# Patient Record
Sex: FEMALE | Race: OTHER | HISPANIC OR LATINO | URBAN - METROPOLITAN AREA
[De-identification: names, ages, dates, MRNs, and addresses within clinical notes are randomized per-mention and may not be internally consistent; named-entity substitution may affect disease eponyms.]

---

## 2017-10-17 ENCOUNTER — EMERGENCY (EMERGENCY)
Facility: HOSPITAL | Age: 22
LOS: 1 days | Discharge: HOME | End: 2017-10-17
Attending: EMERGENCY MEDICINE | Admitting: EMERGENCY MEDICINE
Payer: MEDICAID

## 2017-10-17 VITALS
RESPIRATION RATE: 18 BRPM | TEMPERATURE: 98 F | DIASTOLIC BLOOD PRESSURE: 78 MMHG | HEART RATE: 83 BPM | SYSTOLIC BLOOD PRESSURE: 125 MMHG | OXYGEN SATURATION: 100 %

## 2017-10-17 VITALS — WEIGHT: 164.91 LBS | HEIGHT: 65 IN

## 2017-10-17 DIAGNOSIS — O46.90 ANTEPARTUM HEMORRHAGE, UNSPECIFIED, UNSPECIFIED TRIMESTER: ICD-10-CM

## 2017-10-17 LAB
ALBUMIN SERPL ELPH-MCNC: 4.1 G/DL — SIGNIFICANT CHANGE UP (ref 3.3–5.2)
ALP SERPL-CCNC: 53 U/L — SIGNIFICANT CHANGE UP (ref 40–120)
ALT FLD-CCNC: 21 U/L — SIGNIFICANT CHANGE UP
ANION GAP SERPL CALC-SCNC: 17 MMOL/L — SIGNIFICANT CHANGE UP (ref 5–17)
AST SERPL-CCNC: 29 U/L — SIGNIFICANT CHANGE UP
BASOPHILS # BLD AUTO: 0 K/UL — SIGNIFICANT CHANGE UP (ref 0–0.2)
BASOPHILS NFR BLD AUTO: 0.2 % — SIGNIFICANT CHANGE UP (ref 0–2)
BILIRUB SERPL-MCNC: 0.1 MG/DL — LOW (ref 0.4–2)
BLD GP AB SCN SERPL QL: SIGNIFICANT CHANGE UP
BUN SERPL-MCNC: 13 MG/DL — SIGNIFICANT CHANGE UP (ref 8–20)
CALCIUM SERPL-MCNC: 9.3 MG/DL — SIGNIFICANT CHANGE UP (ref 8.6–10.2)
CHLORIDE SERPL-SCNC: 104 MMOL/L — SIGNIFICANT CHANGE UP (ref 98–107)
CO2 SERPL-SCNC: 18 MMOL/L — LOW (ref 22–29)
CREAT SERPL-MCNC: 0.57 MG/DL — SIGNIFICANT CHANGE UP (ref 0.5–1.3)
EOSINOPHIL # BLD AUTO: 0 K/UL — SIGNIFICANT CHANGE UP (ref 0–0.5)
EOSINOPHIL NFR BLD AUTO: 0.4 % — SIGNIFICANT CHANGE UP (ref 0–6)
GLUCOSE SERPL-MCNC: 77 MG/DL — SIGNIFICANT CHANGE UP (ref 70–115)
HCG SERPL-ACNC: SIGNIFICANT CHANGE UP MIU/ML
HCT VFR BLD CALC: 37.3 % — SIGNIFICANT CHANGE UP (ref 37–47)
HGB BLD-MCNC: 12.9 G/DL — SIGNIFICANT CHANGE UP (ref 12–16)
LYMPHOCYTES # BLD AUTO: 2.4 K/UL — SIGNIFICANT CHANGE UP (ref 1–4.8)
LYMPHOCYTES # BLD AUTO: 23.1 % — SIGNIFICANT CHANGE UP (ref 20–55)
MCHC RBC-ENTMCNC: 29.5 PG — SIGNIFICANT CHANGE UP (ref 27–31)
MCHC RBC-ENTMCNC: 34.6 G/DL — SIGNIFICANT CHANGE UP (ref 32–36)
MCV RBC AUTO: 85.4 FL — SIGNIFICANT CHANGE UP (ref 81–99)
MONOCYTES # BLD AUTO: 0.4 K/UL — SIGNIFICANT CHANGE UP (ref 0–0.8)
MONOCYTES NFR BLD AUTO: 4.3 % — SIGNIFICANT CHANGE UP (ref 3–10)
NEUTROPHILS # BLD AUTO: 7.4 K/UL — SIGNIFICANT CHANGE UP (ref 1.8–8)
NEUTROPHILS NFR BLD AUTO: 70.7 % — SIGNIFICANT CHANGE UP (ref 37–73)
PLATELET # BLD AUTO: 225 K/UL — SIGNIFICANT CHANGE UP (ref 150–400)
POTASSIUM SERPL-MCNC: 4.7 MMOL/L — SIGNIFICANT CHANGE UP (ref 3.5–5.3)
POTASSIUM SERPL-SCNC: 4.7 MMOL/L — SIGNIFICANT CHANGE UP (ref 3.5–5.3)
PROT SERPL-MCNC: 7.6 G/DL — SIGNIFICANT CHANGE UP (ref 6.6–8.7)
RBC # BLD: 4.37 M/UL — LOW (ref 4.4–5.2)
RBC # FLD: 13 % — SIGNIFICANT CHANGE UP (ref 11–15.6)
SODIUM SERPL-SCNC: 139 MMOL/L — SIGNIFICANT CHANGE UP (ref 135–145)
TYPE + AB SCN PNL BLD: SIGNIFICANT CHANGE UP
WBC # BLD: 10.5 K/UL — SIGNIFICANT CHANGE UP (ref 4.8–10.8)
WBC # FLD AUTO: 10.5 K/UL — SIGNIFICANT CHANGE UP (ref 4.8–10.8)

## 2017-10-17 PROCEDURE — 99284 EMERGENCY DEPT VISIT MOD MDM: CPT

## 2017-10-17 PROCEDURE — 76801 OB US < 14 WKS SINGLE FETUS: CPT

## 2017-10-17 PROCEDURE — 36415 COLL VENOUS BLD VENIPUNCTURE: CPT

## 2017-10-17 PROCEDURE — 99284 EMERGENCY DEPT VISIT MOD MDM: CPT | Mod: 25

## 2017-10-17 PROCEDURE — 85027 COMPLETE CBC AUTOMATED: CPT

## 2017-10-17 PROCEDURE — 86850 RBC ANTIBODY SCREEN: CPT

## 2017-10-17 PROCEDURE — 76815 OB US LIMITED FETUS(S): CPT | Mod: 26

## 2017-10-17 PROCEDURE — T1013: CPT

## 2017-10-17 PROCEDURE — 84702 CHORIONIC GONADOTROPIN TEST: CPT

## 2017-10-17 PROCEDURE — 80053 COMPREHEN METABOLIC PANEL: CPT

## 2017-10-17 PROCEDURE — 76815 OB US LIMITED FETUS(S): CPT

## 2017-10-17 PROCEDURE — 86900 BLOOD TYPING SEROLOGIC ABO: CPT

## 2017-10-17 PROCEDURE — 76801 OB US < 14 WKS SINGLE FETUS: CPT | Mod: 26

## 2017-10-17 PROCEDURE — 86901 BLOOD TYPING SEROLOGIC RH(D): CPT

## 2017-10-17 NOTE — ED PROVIDER NOTE - PROGRESS NOTE DETAILS
patient deferred GYN exam care signed out to KATHIE Kapoor to f/u labs, UA and US and possible GYN consult OBGYN to see PT Pt seen by MD TENISHA states PT to follow up Out PT, PT educated on when to return to ED.

## 2017-10-17 NOTE — ED PROVIDER NOTE - OBJECTIVE STATEMENT
This is a 22 year old female with c/o vaginal bleeding x 1 day, states feels like a "menstrual cycle."  She notes is approximately 10 weeks pregnant.  She admits this is her first pregnancy and f/u in [facility] clinic and has blood work done and does not know the result.  She denies any abdominal pain, fevers, chills, n/v/d or any sick contacts or recent travel or rashes.

## 2017-10-17 NOTE — ED PROVIDER NOTE - ATTENDING CONTRIBUTION TO CARE
I, Fide Razo, performed the initial face to face bedside interview with this patient regarding history of present illness, review of symptoms and relevant past medical, social and family history.  I completed an independent physical examination.  I was the initial provider who evaluated this patient. I have signed out the follow up of any pending tests (i.e. labs, radiological studies) to the ACP.  I have communicated the patient’s plan of care and disposition with the ACP.

## 2017-10-17 NOTE — CONSULT NOTE ADULT - PROBLEM SELECTOR RECOMMENDATION 9
-most like 2/2 to sexually activity   -no active bleeding, pt hemodynamically stable  - no sexually activity in the next 2 weeks  -f/u with [facility] obgyn doctor  -seek medical attention in case of severe/profuse vaginal bleeding   -Pt is O+, recent blood work from [facility] is nl  -US shows a viable 12 weeks pregnancy  -H&H stable -most like 2/2 to sexually activity   - pt hemodynamically stable   - no sexually activity in the next 2 weeks  -f/u with [facility] obgyn doctor  -seek medical attention in case of severe/profuse vaginal bleeding   -Pt is O+, recent blood work from [facility] is nl  -US shows a viable 12 weeks pregnancy  -H&H stable

## 2017-10-17 NOTE — CONSULT NOTE ADULT - SUBJECTIVE AND OBJECTIVE BOX
DAQUAN CHEUNG  A 22y  LMP 17 at 12. 1 weeks by LMP came to the ED with CC of vaginal bleeding, pt state that she had just one episode of VB this am after sexually activity , she changed her pap just one time, no previous hx of vaginal bleeding, no recent trauma, no pmh of bleeding disorders . Pt is in f/u in the [facility], no acitve bleeding at this time, hemodynamically stable.       PAST MEDICAL & SURGICAL HISTORY: none   No pertinent past medical history: none  No significant past surgical history: none       Allergies:  No Known Allergies    Intolerances      FAMILY HISTORY: unremarkable     Social History: Denies ETOH, smoking and drugs.   POB/GYN Hx: regular menses lasting 4-5 days , no previous abortions     Vital Signs:  Vital Signs Last 24 Hrs  T(C): 36.7 (17 Oct 2017 17:56), Max: 36.7 (17 Oct 2017 17:56)  T(F): 98 (17 Oct 2017 17:56), Max: 98 (17 Oct 2017 17:56)  HR: 83 (17 Oct 2017 17:56) (83 - 83)  BP: 125/78 (17 Oct 2017 17:56) (125/78 - 125/78)  BP(mean): --  RR: 18 (17 Oct 2017 17:56) (18 - 18)  SpO2: 100% (17 Oct 2017 17:56) (100% - 100%)    Physical Exam:  General: NAD  CVS: RRR, +S1/S2  Lungs: CTAB, no wheeze, ronchi or rales.  Abdomen: +BS, soft, NT to palpation   Ext: No cyanosis, edema or calf tenderness.     Labs:                          12.9   10.5  )-----------( 225      ( 17 Oct 2017 19:17 )             37.3   10-17    139  |  104  |  13.0  ----------------------------<  77  4.7   |  18.0<L>  |  0.57    Ca    9.3      17 Oct 2017 19:17    TPro  7.6  /  Alb  4.1  /  TBili  0.1<L>  /  DBili  x   /  AST  29  /  ALT  21  /  AlkPhos  53  10-17      HCG Quantitative, Serum: 70160 mIU/mL (10-17-17 @ 19:17)      Radiology:   US Echo OB <=14 Weeks, First Gestation (10.17.17 @ 20:19) >    IMPRESSION:    Single live intrauterine pregnancy.  Estimated gestational age of 12 weeks and 0 days  Estimated due date of  2018.  Additional findings above.          MEDICATIONS  (STANDING):    MEDICATIONS  (PRN): DAQUAN CHEUNG  A 22y  LMP 17 at 12. 1 weeks by LMP came to the ED with CC of vaginal bleeding, pt state that she had just one episode of VB this am after sexually activity , she changed her pad just one time, no previous hx of vaginal bleeding, no recent trauma, no pmh of bleeding disorders . Pt is in f/u in the [facility], no acitve bleeding at this time, hemodynamically stable.       PAST MEDICAL & SURGICAL HISTORY: none   No pertinent past medical history: none  No significant past surgical history: none       Allergies:  No Known Allergies    Intolerances      FAMILY HISTORY: unremarkable     Social History: Denies ETOH, smoking and drugs.   POB/GYN Hx: regular menses lasting 4-5 days , no previous abortions     Vital Signs:  Vital Signs Last 24 Hrs  T(C): 36.7 (17 Oct 2017 17:56), Max: 36.7 (17 Oct 2017 17:56)  T(F): 98 (17 Oct 2017 17:56), Max: 98 (17 Oct 2017 17:56)  HR: 83 (17 Oct 2017 17:56) (83 - 83)  BP: 125/78 (17 Oct 2017 17:56) (125/78 - 125/78)  BP(mean): --  RR: 18 (17 Oct 2017 17:56) (18 - 18)  SpO2: 100% (17 Oct 2017 17:56) (100% - 100%)    Physical Exam:  General: NAD  CVS: RRR, +S1/S2  Lungs: CTAB, no wheeze, ronchi or rales.  Abdomen: +BS, soft, NT to palpation   Ext: No cyanosis, edema or calf tenderness.     Labs:                          12.9   10.5  )-----------( 225      ( 17 Oct 2017 19:17 )             37.3   10-17    139  |  104  |  13.0  ----------------------------<  77  4.7   |  18.0<L>  |  0.57    Ca    9.3      17 Oct 2017 19:17    TPro  7.6  /  Alb  4.1  /  TBili  0.1<L>  /  DBili  x   /  AST  29  /  ALT  21  /  AlkPhos  53  10-17      HCG Quantitative, Serum: 23806 mIU/mL (10-17-17 @ 19:17)      Radiology:   US Echo OB <=14 Weeks, First Gestation (10.17.17 @ 20:19) >    IMPRESSION:    Single live intrauterine pregnancy.  Estimated gestational age of 12 weeks and 0 days  Estimated due date of  2018.  Additional findings above.          MEDICATIONS  (STANDING):    MEDICATIONS  (PRN):

## 2017-10-17 NOTE — CONSULT NOTE ADULT - ATTENDING COMMENTS
patient status post intercourse with subsequent vaginal bleed. patient denies any active bleeding at this time and sono shows viable 12 week fetus. patient instructed to avoid intercourse until follow up with MD at the [facility] center  subsequent sono scheduled for 10/23/2017

## 2017-10-17 NOTE — CONSULT NOTE ADULT - ASSESSMENT
A 22y  LMP 17 at 12. 1 weeks by LMP and 12 weeks by US, cc of vaginal bleeding this am after having sexually activity , stable , no active bleeding at this time A 22y  LMP 17 at 12. 1 weeks by LMP and 12 weeks by US, cc of vaginal bleeding this am after having sexually activity , stable

## 2017-10-23 ENCOUNTER — ASOB RESULT (OUTPATIENT)
Age: 22
End: 2017-10-23

## 2017-10-23 ENCOUNTER — APPOINTMENT (OUTPATIENT)
Dept: ANTEPARTUM | Facility: CLINIC | Age: 22
End: 2017-10-23
Payer: MEDICAID

## 2017-10-23 PROBLEM — Z00.00 ENCOUNTER FOR PREVENTIVE HEALTH EXAMINATION: Status: ACTIVE | Noted: 2017-10-23

## 2017-10-23 PROCEDURE — 76801 OB US < 14 WKS SINGLE FETUS: CPT

## 2017-12-18 ENCOUNTER — ASOB RESULT (OUTPATIENT)
Age: 22
End: 2017-12-18

## 2017-12-18 ENCOUNTER — APPOINTMENT (OUTPATIENT)
Dept: ANTEPARTUM | Facility: CLINIC | Age: 22
End: 2017-12-18
Payer: MEDICAID

## 2017-12-18 PROCEDURE — 76817 TRANSVAGINAL US OBSTETRIC: CPT

## 2017-12-18 PROCEDURE — 76811 OB US DETAILED SNGL FETUS: CPT

## 2018-01-02 ENCOUNTER — APPOINTMENT (OUTPATIENT)
Dept: ANTEPARTUM | Facility: CLINIC | Age: 23
End: 2018-01-02
Payer: MEDICAID

## 2018-01-02 ENCOUNTER — ASOB RESULT (OUTPATIENT)
Age: 23
End: 2018-01-02

## 2018-01-02 PROCEDURE — 76816 OB US FOLLOW-UP PER FETUS: CPT

## 2018-04-04 ENCOUNTER — APPOINTMENT (OUTPATIENT)
Dept: ANTEPARTUM | Facility: CLINIC | Age: 23
End: 2018-04-04

## 2018-04-04 PROBLEM — Z00.00 ENCOUNTER FOR PREVENTIVE HEALTH EXAMINATION: Noted: 2018-04-04

## 2018-04-16 ENCOUNTER — APPOINTMENT (OUTPATIENT)
Dept: ANTEPARTUM | Facility: CLINIC | Age: 23
End: 2018-04-16
Payer: MEDICAID

## 2018-04-16 PROCEDURE — 76819 FETAL BIOPHYS PROFIL W/O NST: CPT

## 2018-04-16 PROCEDURE — 76816 OB US FOLLOW-UP PER FETUS: CPT

## 2018-04-22 ENCOUNTER — OUTPATIENT (OUTPATIENT)
Dept: OUTPATIENT SERVICES | Facility: HOSPITAL | Age: 23
LOS: 1 days | End: 2018-04-22
Payer: MEDICAID

## 2018-04-22 DIAGNOSIS — O47.1 FALSE LABOR AT OR AFTER 37 COMPLETED WEEKS OF GESTATION: ICD-10-CM

## 2018-04-22 PROCEDURE — T1013: CPT

## 2018-04-22 PROCEDURE — G0463: CPT

## 2018-04-22 PROCEDURE — 59025 FETAL NON-STRESS TEST: CPT

## 2018-04-26 ENCOUNTER — INPATIENT (INPATIENT)
Facility: HOSPITAL | Age: 23
LOS: 2 days | Discharge: HOME | End: 2018-04-29
Attending: OBSTETRICS & GYNECOLOGY | Admitting: OBSTETRICS & GYNECOLOGY
Payer: MEDICAID

## 2018-04-26 DIAGNOSIS — O47.1 FALSE LABOR AT OR AFTER 37 COMPLETED WEEKS OF GESTATION: ICD-10-CM

## 2018-04-26 LAB
APPEARANCE UR: ABNORMAL
BACTERIA # UR AUTO: ABNORMAL
BASOPHILS # BLD AUTO: 0 K/UL — SIGNIFICANT CHANGE UP (ref 0–0.2)
BASOPHILS NFR BLD AUTO: 0.1 % — SIGNIFICANT CHANGE UP (ref 0–2)
BILIRUB UR-MCNC: NEGATIVE — SIGNIFICANT CHANGE UP
BLD GP AB SCN SERPL QL: SIGNIFICANT CHANGE UP
COLOR SPEC: YELLOW — SIGNIFICANT CHANGE UP
DIFF PNL FLD: NEGATIVE — SIGNIFICANT CHANGE UP
EOSINOPHIL # BLD AUTO: 0 K/UL — SIGNIFICANT CHANGE UP (ref 0–0.5)
EOSINOPHIL NFR BLD AUTO: 0.7 % — SIGNIFICANT CHANGE UP (ref 0–6)
EPI CELLS # UR: ABNORMAL
GLUCOSE UR QL: NEGATIVE MG/DL — SIGNIFICANT CHANGE UP
HCT VFR BLD CALC: 32.6 % — LOW (ref 37–47)
HGB BLD-MCNC: 10.7 G/DL — LOW (ref 12–16)
KETONES UR-MCNC: NEGATIVE — SIGNIFICANT CHANGE UP
LEUKOCYTE ESTERASE UR-ACNC: ABNORMAL
LYMPHOCYTES # BLD AUTO: 2.2 K/UL — SIGNIFICANT CHANGE UP (ref 1–4.8)
LYMPHOCYTES # BLD AUTO: 28.8 % — SIGNIFICANT CHANGE UP (ref 20–55)
MCHC RBC-ENTMCNC: 28.2 PG — SIGNIFICANT CHANGE UP (ref 27–31)
MCHC RBC-ENTMCNC: 32.8 G/DL — SIGNIFICANT CHANGE UP (ref 32–36)
MCV RBC AUTO: 86 FL — SIGNIFICANT CHANGE UP (ref 81–99)
MONOCYTES # BLD AUTO: 0.6 K/UL — SIGNIFICANT CHANGE UP (ref 0–0.8)
MONOCYTES NFR BLD AUTO: 7.5 % — SIGNIFICANT CHANGE UP (ref 3–10)
NEUTROPHILS # BLD AUTO: 4.6 K/UL — SIGNIFICANT CHANGE UP (ref 1.8–8)
NEUTROPHILS NFR BLD AUTO: 62 % — SIGNIFICANT CHANGE UP (ref 37–73)
NITRITE UR-MCNC: NEGATIVE — SIGNIFICANT CHANGE UP
PH UR: 6 — SIGNIFICANT CHANGE UP (ref 5–8)
PLATELET # BLD AUTO: 178 K/UL — SIGNIFICANT CHANGE UP (ref 150–400)
PROT UR-MCNC: NEGATIVE MG/DL — SIGNIFICANT CHANGE UP
RBC # BLD: 3.79 M/UL — LOW (ref 4.4–5.2)
RBC # FLD: 13.2 % — SIGNIFICANT CHANGE UP (ref 11–15.6)
RBC CASTS # UR COMP ASSIST: ABNORMAL /HPF (ref 0–4)
SP GR SPEC: 1.01 — SIGNIFICANT CHANGE UP (ref 1.01–1.02)
TYPE + AB SCN PNL BLD: SIGNIFICANT CHANGE UP
UROBILINOGEN FLD QL: NEGATIVE MG/DL — SIGNIFICANT CHANGE UP
WBC # BLD: 7.5 K/UL — SIGNIFICANT CHANGE UP (ref 4.8–10.8)
WBC # FLD AUTO: 7.5 K/UL — SIGNIFICANT CHANGE UP (ref 4.8–10.8)
WBC UR QL: ABNORMAL

## 2018-04-26 RX ORDER — PENICILLIN G POTASSIUM 5000000 [IU]/1
5 POWDER, FOR SOLUTION INTRAMUSCULAR; INTRAPLEURAL; INTRATHECAL; INTRAVENOUS ONCE
Qty: 0 | Refills: 0 | Status: COMPLETED | OUTPATIENT
Start: 2018-04-26 | End: 2018-04-26

## 2018-04-26 RX ORDER — OXYTOCIN 10 UNIT/ML
333.33 VIAL (ML) INJECTION
Qty: 20 | Refills: 0 | Status: DISCONTINUED | OUTPATIENT
Start: 2018-04-26 | End: 2018-04-27

## 2018-04-26 RX ORDER — CITRIC ACID/SODIUM CITRATE 300-500 MG
15 SOLUTION, ORAL ORAL EVERY 4 HOURS
Qty: 0 | Refills: 0 | Status: DISCONTINUED | OUTPATIENT
Start: 2018-04-26 | End: 2018-04-27

## 2018-04-26 RX ORDER — PENICILLIN G POTASSIUM 5000000 [IU]/1
POWDER, FOR SOLUTION INTRAMUSCULAR; INTRAPLEURAL; INTRATHECAL; INTRAVENOUS
Qty: 0 | Refills: 0 | Status: DISCONTINUED | OUTPATIENT
Start: 2018-04-26 | End: 2018-04-29

## 2018-04-26 RX ORDER — SODIUM CHLORIDE 9 MG/ML
1000 INJECTION, SOLUTION INTRAVENOUS ONCE
Qty: 0 | Refills: 0 | Status: DISCONTINUED | OUTPATIENT
Start: 2018-04-26 | End: 2018-04-27

## 2018-04-26 RX ORDER — PENICILLIN G POTASSIUM 5000000 [IU]/1
2.5 POWDER, FOR SOLUTION INTRAMUSCULAR; INTRAPLEURAL; INTRATHECAL; INTRAVENOUS EVERY 4 HOURS
Qty: 0 | Refills: 0 | Status: DISCONTINUED | OUTPATIENT
Start: 2018-04-27 | End: 2018-04-29

## 2018-04-26 RX ORDER — SODIUM CHLORIDE 9 MG/ML
1000 INJECTION, SOLUTION INTRAVENOUS
Qty: 0 | Refills: 0 | Status: DISCONTINUED | OUTPATIENT
Start: 2018-04-26 | End: 2018-04-27

## 2018-04-26 RX ORDER — SODIUM CHLORIDE 9 MG/ML
500 INJECTION, SOLUTION INTRAVENOUS ONCE
Qty: 0 | Refills: 0 | Status: DISCONTINUED | OUTPATIENT
Start: 2018-04-26 | End: 2018-04-29

## 2018-04-26 RX ADMIN — PENICILLIN G POTASSIUM 100 MILLION UNIT(S): 5000000 POWDER, FOR SOLUTION INTRAMUSCULAR; INTRAPLEURAL; INTRATHECAL; INTRAVENOUS at 21:28

## 2018-04-27 ENCOUNTER — TRANSCRIPTION ENCOUNTER (OUTPATIENT)
Age: 23
End: 2018-04-27

## 2018-04-27 VITALS — WEIGHT: 191.8 LBS | HEIGHT: 63 IN

## 2018-04-27 LAB
BASE EXCESS BLDCOA CALC-SCNC: -5 MMOL/L — LOW (ref -2–2)
BASE EXCESS BLDCOV CALC-SCNC: -3.9 MMOL/L — LOW (ref -2–2)
GAS PNL BLDCOV: 7.36 — SIGNIFICANT CHANGE UP (ref 7.25–7.45)
HCO3 BLDCOA-SCNC: 19 MMOL/L — LOW (ref 21–29)
HCO3 BLDCOV-SCNC: 21 MMOL/L — SIGNIFICANT CHANGE UP (ref 21–29)
PCO2 BLDCOA: 50.3 MMHG — SIGNIFICANT CHANGE UP (ref 32–68)
PCO2 BLDCOV: 37.2 MMHG — SIGNIFICANT CHANGE UP (ref 29–53)
PH BLDCOA: 7.26 — SIGNIFICANT CHANGE UP (ref 7.18–7.38)
PO2 BLDCOA: 18.1 MMHG — SIGNIFICANT CHANGE UP (ref 5.7–30.5)
PO2 BLDCOA: 36.2 MMHG — SIGNIFICANT CHANGE UP (ref 17–41)
SAO2 % BLDCOA: SIGNIFICANT CHANGE UP
SAO2 % BLDCOV: SIGNIFICANT CHANGE UP

## 2018-04-27 RX ORDER — AER TRAVELER 0.5 G/1
1 SOLUTION RECTAL; TOPICAL EVERY 4 HOURS
Qty: 0 | Refills: 0 | Status: DISCONTINUED | OUTPATIENT
Start: 2018-04-27 | End: 2018-04-29

## 2018-04-27 RX ORDER — OXYCODONE AND ACETAMINOPHEN 5; 325 MG/1; MG/1
2 TABLET ORAL EVERY 6 HOURS
Qty: 0 | Refills: 0 | Status: DISCONTINUED | OUTPATIENT
Start: 2018-04-27 | End: 2018-04-29

## 2018-04-27 RX ORDER — OXYTOCIN 10 UNIT/ML
2 VIAL (ML) INJECTION
Qty: 30 | Refills: 0 | Status: DISCONTINUED | OUTPATIENT
Start: 2018-04-27 | End: 2018-04-29

## 2018-04-27 RX ORDER — DIPHENHYDRAMINE HCL 50 MG
25 CAPSULE ORAL EVERY 6 HOURS
Qty: 0 | Refills: 0 | Status: DISCONTINUED | OUTPATIENT
Start: 2018-04-27 | End: 2018-04-29

## 2018-04-27 RX ORDER — MAGNESIUM HYDROXIDE 400 MG/1
30 TABLET, CHEWABLE ORAL
Qty: 0 | Refills: 0 | Status: DISCONTINUED | OUTPATIENT
Start: 2018-04-27 | End: 2018-04-29

## 2018-04-27 RX ORDER — PRAMOXINE HYDROCHLORIDE 150 MG/15G
1 AEROSOL, FOAM RECTAL EVERY 4 HOURS
Qty: 0 | Refills: 0 | Status: DISCONTINUED | OUTPATIENT
Start: 2018-04-27 | End: 2018-04-29

## 2018-04-27 RX ORDER — SODIUM CHLORIDE 9 MG/ML
3 INJECTION INTRAMUSCULAR; INTRAVENOUS; SUBCUTANEOUS EVERY 8 HOURS
Qty: 0 | Refills: 0 | Status: DISCONTINUED | OUTPATIENT
Start: 2018-04-27 | End: 2018-04-29

## 2018-04-27 RX ORDER — SIMETHICONE 80 MG/1
80 TABLET, CHEWABLE ORAL EVERY 6 HOURS
Qty: 0 | Refills: 0 | Status: DISCONTINUED | OUTPATIENT
Start: 2018-04-27 | End: 2018-04-29

## 2018-04-27 RX ORDER — TETANUS TOXOID, REDUCED DIPHTHERIA TOXOID AND ACELLULAR PERTUSSIS VACCINE, ADSORBED 5; 2.5; 8; 8; 2.5 [IU]/.5ML; [IU]/.5ML; UG/.5ML; UG/.5ML; UG/.5ML
0.5 SUSPENSION INTRAMUSCULAR ONCE
Qty: 0 | Refills: 0 | Status: DISCONTINUED | OUTPATIENT
Start: 2018-04-27 | End: 2018-04-29

## 2018-04-27 RX ORDER — GLYCERIN ADULT
1 SUPPOSITORY, RECTAL RECTAL AT BEDTIME
Qty: 0 | Refills: 0 | Status: DISCONTINUED | OUTPATIENT
Start: 2018-04-27 | End: 2018-04-29

## 2018-04-27 RX ORDER — OXYTOCIN 10 UNIT/ML
41.67 VIAL (ML) INJECTION
Qty: 20 | Refills: 0 | Status: DISCONTINUED | OUTPATIENT
Start: 2018-04-27 | End: 2018-04-29

## 2018-04-27 RX ORDER — CITRIC ACID/SODIUM CITRATE 300-500 MG
30 SOLUTION, ORAL ORAL ONCE
Qty: 0 | Refills: 0 | Status: DISCONTINUED | OUTPATIENT
Start: 2018-04-27 | End: 2018-04-29

## 2018-04-27 RX ORDER — DIBUCAINE 1 %
1 OINTMENT (GRAM) RECTAL EVERY 4 HOURS
Qty: 0 | Refills: 0 | Status: DISCONTINUED | OUTPATIENT
Start: 2018-04-27 | End: 2018-04-29

## 2018-04-27 RX ORDER — IBUPROFEN 200 MG
600 TABLET ORAL EVERY 6 HOURS
Qty: 0 | Refills: 0 | Status: DISCONTINUED | OUTPATIENT
Start: 2018-04-27 | End: 2018-04-29

## 2018-04-27 RX ORDER — DOCUSATE SODIUM 100 MG
100 CAPSULE ORAL
Qty: 0 | Refills: 0 | Status: DISCONTINUED | OUTPATIENT
Start: 2018-04-27 | End: 2018-04-29

## 2018-04-27 RX ORDER — HYDROCORTISONE 1 %
1 OINTMENT (GRAM) TOPICAL EVERY 4 HOURS
Qty: 0 | Refills: 0 | Status: DISCONTINUED | OUTPATIENT
Start: 2018-04-27 | End: 2018-04-29

## 2018-04-27 RX ORDER — ACETAMINOPHEN 500 MG
650 TABLET ORAL EVERY 6 HOURS
Qty: 0 | Refills: 0 | Status: DISCONTINUED | OUTPATIENT
Start: 2018-04-27 | End: 2018-04-29

## 2018-04-27 RX ORDER — LANOLIN
1 OINTMENT (GRAM) TOPICAL EVERY 6 HOURS
Qty: 0 | Refills: 0 | Status: DISCONTINUED | OUTPATIENT
Start: 2018-04-27 | End: 2018-04-29

## 2018-04-27 RX ADMIN — PENICILLIN G POTASSIUM 100 MILLION UNIT(S): 5000000 POWDER, FOR SOLUTION INTRAMUSCULAR; INTRAPLEURAL; INTRATHECAL; INTRAVENOUS at 09:45

## 2018-04-27 RX ADMIN — PENICILLIN G POTASSIUM 100 MILLION UNIT(S): 5000000 POWDER, FOR SOLUTION INTRAMUSCULAR; INTRAPLEURAL; INTRATHECAL; INTRAVENOUS at 05:40

## 2018-04-27 RX ADMIN — PENICILLIN G POTASSIUM 100 MILLION UNIT(S): 5000000 POWDER, FOR SOLUTION INTRAMUSCULAR; INTRAPLEURAL; INTRATHECAL; INTRAVENOUS at 01:37

## 2018-04-27 RX ADMIN — Medication 2 MILLIUNIT(S)/MIN: at 02:50

## 2018-04-27 RX ADMIN — Medication 600 MILLIGRAM(S): at 23:28

## 2018-04-27 NOTE — DISCHARGE NOTE OB - CARE PLAN
Principal Discharge DX:	 (normal spontaneous vaginal delivery)  Goal:	normal post partum care  Assessment and plan of treatment:	You may have bleeding from your vagina for up to 6 weeks. Early on, you may pass some small clots when you first get up.  In most cases, bleeding decreases the most during the first week. It may not stop completely for several weeks. It is not uncommon to have an increase in red bleeding around 7 to 14 days, when the scab forms over the spot where your placenta was shed.    Your menstrual period is likely to return in:  4 to 9 weeks after your delivery if you're not breastfeeding  3 to 12 months if you are breastfeeding, and perhaps not for several weeks after you completely stop breastfeeding  You may lose up to 20 pounds (9 kilograms) over the first 2 weeks after having your baby. After that, weight loss of around one half pound (250 grams) per week is best. Your health care provider can explain more about losing weight after pregnancy.    Your uterus will be hard and round, and can most often be felt around the navel. You may feel contractions for a few days.  If you are not breastfeeding, breast engorgement may continue for a few days.    Wear a supportive bra 24 hours a day for the first 1 to 2 weeks.  Avoid any nipple stimulation.;  Use ice packs; Take ibuprofen;  You will need a checkup with your provider in 4 to 6 weeks.

## 2018-04-27 NOTE — DISCHARGE NOTE OB - ADDITIONAL INSTRUCTIONS
f/u with obgyn at 6weeks for postpartum follow up.  continue medications prescribed  You can return to normal activities, such as light office work or house cleaning, and walking, when you feel ready. Wait 6 weeks before you:  Use tampons, Have sex, Do impact exercises, such as jogging, dancing, or lifting weights,To avoid constipation (hard stools):  Eat a high-fiber diet with plenty of fruits and vegetables, Drink 8 cups (2 liters) of water a day to prevent constipation and bladder infections.  Use a stool softener (not enemas or laxatives)  Call your provider if you have vaginal bleeding that is:  Heavier than 1 pad per hour or you have clots that are bigger than a golf ball  Still heavy (like your menstrual period flow) after more than 4 days, except for the expected increase around 7 to 14 days for a day or so  Either spotting or bleeding and returns after going away for more than a few days  Also call your provider if you have:  Swelling or pain in one of your legs (it will be slightly redder and warmer than the other leg)  Fever, Increased pain in your belly, Increased pain over your episiotomy or in that area  Discharge from your vagina that becomes heavier or develops a foul odor  Sadness, depression, withdrawn feeling, feelings of harming yourself or your baby, or inability to care for yourself or your baby.  A tender, reddened, or warm area on one breast. This may be a sign of infection

## 2018-04-27 NOTE — DISCHARGE NOTE OB - MATERIALS PROVIDED
Breastfeeding Log/Breastfeeding Mother’s Support Group Information/Tdap Vaccination (VIS Pub Date: 2012)/Breastfeeding Guide and Packet/Guide to Postpartum Care/Birth Certificate Instructions/[facility]  Screening Program/[facility] Hearing Screen Program/Shaken Baby Prevention Handout/Back To Sleep Handout

## 2018-04-27 NOTE — DISCHARGE NOTE OB - PLAN OF CARE
normal post partum care You may have bleeding from your vagina for up to 6 weeks. Early on, you may pass some small clots when you first get up.  In most cases, bleeding decreases the most during the first week. It may not stop completely for several weeks. It is not uncommon to have an increase in red bleeding around 7 to 14 days, when the scab forms over the spot where your placenta was shed.    Your menstrual period is likely to return in:  4 to 9 weeks after your delivery if you're not breastfeeding  3 to 12 months if you are breastfeeding, and perhaps not for several weeks after you completely stop breastfeeding  You may lose up to 20 pounds (9 kilograms) over the first 2 weeks after having your baby. After that, weight loss of around one half pound (250 grams) per week is best. Your health care provider can explain more about losing weight after pregnancy.    Your uterus will be hard and round, and can most often be felt around the navel. You may feel contractions for a few days.  If you are not breastfeeding, breast engorgement may continue for a few days.    Wear a supportive bra 24 hours a day for the first 1 to 2 weeks.  Avoid any nipple stimulation.;  Use ice packs; Take ibuprofen;  You will need a checkup with your provider in 4 to 6 weeks.

## 2018-04-27 NOTE — DISCHARGE NOTE OB - CARE PROVIDER_API CALL
Moises Corona), [facility] Obstetrics and Gynecology  [facility] [place] [address]  Phone: [phone]  Fax: [phone]

## 2018-04-27 NOTE — DISCHARGE NOTE OB - MEDICATION SUMMARY - MEDICATIONS TO TAKE
I will START or STAY ON the medications listed below when I get home from the hospital:     mg oral tablet  -- 1 tab(s) by mouth 3 times a day MDD:prn for pain;  maximum of 3 tabs daily;  -- Do not take this drug if you are pregnant.  It is very important that you take or use this exactly as directed.  Do not skip doses or discontinue unless directed by your doctor.  May cause drowsiness or dizziness.  Obtain medical advice before taking any non-prescription drugs as some may affect the action of this medication.  Take with food or milk.    -- Indication: For pain    Prenatal Multivitamins with Folic Acid 1 mg oral tablet  -- 1 tab(s) by mouth once a day   -- May discolor urine or feces.  Take with food or milk.    -- Indication: For Nutrition    FeroSul 325 mg (65 mg elemental iron) oral tablet  -- 1 tab(s) by mouth once a day   -- Check with your doctor before becoming pregnant.  Do not chew, break, or crush.  May discolor urine or feces.    -- Indication: For anemia    ascorbic acid 250 mg oral tablet  -- 1 tab(s) by mouth once a day   -- Indication: For anemia

## 2018-04-27 NOTE — DISCHARGE NOTE OB - HOSPITAL COURSE
24y/o now  s/p  at 39wk and 1 days.  Admitted for oligohydramnios and nonreactive nst.   Postpartum course was unremarkable. Patient was transferred to postpartum floor and monitored. Patient is medically optimized for discharge, instructed to follow up with [facility] in 6 weeks for postpartum care

## 2018-04-27 NOTE — DISCHARGE NOTE OB - PATIENT PORTAL LINK FT
You can access the [facility] Patient Portal, offered by [facility], by registering with the following website: [email or web address]

## 2018-04-28 LAB
BASOPHILS # BLD AUTO: 0 K/UL — SIGNIFICANT CHANGE UP (ref 0–0.2)
BASOPHILS NFR BLD AUTO: 0.1 % — SIGNIFICANT CHANGE UP (ref 0–2)
EOSINOPHIL # BLD AUTO: 0 K/UL — SIGNIFICANT CHANGE UP (ref 0–0.5)
EOSINOPHIL NFR BLD AUTO: 0.4 % — SIGNIFICANT CHANGE UP (ref 0–6)
HCT VFR BLD CALC: 29 % — LOW (ref 37–47)
HGB BLD-MCNC: 9.3 G/DL — LOW (ref 12–16)
LYMPHOCYTES # BLD AUTO: 2.3 K/UL — SIGNIFICANT CHANGE UP (ref 1–4.8)
LYMPHOCYTES # BLD AUTO: 33.3 % — SIGNIFICANT CHANGE UP (ref 20–55)
MCHC RBC-ENTMCNC: 27.7 PG — SIGNIFICANT CHANGE UP (ref 27–31)
MCHC RBC-ENTMCNC: 32.1 G/DL — SIGNIFICANT CHANGE UP (ref 32–36)
MCV RBC AUTO: 86.3 FL — SIGNIFICANT CHANGE UP (ref 81–99)
MONOCYTES # BLD AUTO: 0.5 K/UL — SIGNIFICANT CHANGE UP (ref 0–0.8)
MONOCYTES NFR BLD AUTO: 7.4 % — SIGNIFICANT CHANGE UP (ref 3–10)
NEUTROPHILS # BLD AUTO: 4 K/UL — SIGNIFICANT CHANGE UP (ref 1.8–8)
NEUTROPHILS NFR BLD AUTO: 58.1 % — SIGNIFICANT CHANGE UP (ref 37–73)
PLATELET # BLD AUTO: 150 K/UL — SIGNIFICANT CHANGE UP (ref 150–400)
RBC # BLD: 3.36 M/UL — LOW (ref 4.4–5.2)
RBC # FLD: 13.2 % — SIGNIFICANT CHANGE UP (ref 11–15.6)
T PALLIDUM AB TITR SER: NEGATIVE — SIGNIFICANT CHANGE UP
WBC # BLD: 6.9 K/UL — SIGNIFICANT CHANGE UP (ref 4.8–10.8)
WBC # FLD AUTO: 6.9 K/UL — SIGNIFICANT CHANGE UP (ref 4.8–10.8)

## 2018-04-28 RX ORDER — IBUPROFEN 200 MG
1 TABLET ORAL
Qty: 36 | Refills: 0 | OUTPATIENT
Start: 2018-04-28 | End: 2018-05-09

## 2018-04-28 RX ORDER — FERROUS SULFATE 325(65) MG
1 TABLET ORAL
Qty: 30 | Refills: 0 | OUTPATIENT
Start: 2018-04-28 | End: 2018-05-27

## 2018-04-28 RX ORDER — ASCORBIC ACID 60 MG
1 TABLET,CHEWABLE ORAL
Qty: 30 | Refills: 0 | OUTPATIENT
Start: 2018-04-28 | End: 2018-05-27

## 2018-04-28 RX ADMIN — Medication 1 TABLET(S): at 11:18

## 2018-04-28 RX ADMIN — Medication 600 MILLIGRAM(S): at 00:05

## 2018-04-28 RX ADMIN — Medication 600 MILLIGRAM(S): at 21:30

## 2018-04-28 RX ADMIN — Medication 600 MILLIGRAM(S): at 11:18

## 2018-04-28 RX ADMIN — Medication 600 MILLIGRAM(S): at 20:49

## 2018-04-28 NOTE — PROGRESS NOTE ADULT - SUBJECTIVE AND OBJECTIVE BOX
INTERVAL HPI/OVERNIGHT EVENTS:  23y Female s/p labor epidural on 4/27/18    Vital Signs Last 24 Hrs  T(C): 36.6 (28 Apr 2018 08:12), Max: 37.4 (27 Apr 2018 14:58)  T(F): 97.8 (28 Apr 2018 08:12), Max: 99.3 (27 Apr 2018 14:58)  HR: 80 (28 Apr 2018 08:12) (71 - 99)  BP: 99/67 (28 Apr 2018 08:12) (92/52 - 123/50)  BP(mean): --  RR: 20 (28 Apr 2018 08:12) (16 - 20)  SpO2: 98% (28 Apr 2018 08:12) (98% - 98%)    Patient seen, doing well, no anesthetic complications or complaints noted or reported.

## 2018-04-28 NOTE — PROGRESS NOTE ADULT - PROBLEM SELECTOR PLAN 1
no signs or symptoms of dvt or uri; patient is afebrile or hemodynamically stable;    encourage ambulation and breastfeeding;    Denies any abdominal pain this am;

## 2018-04-28 NOTE — PROGRESS NOTE ADULT - SUBJECTIVE AND OBJECTIVE BOX
23y year old now  s/p  at 39.1 wk.  Patient was admitted for oligohydramnios and nonreactive nst.    Patient seen and examined at bedside, no acute overnight events.   Patient is ambulating, +eating, +PO hydration, +voiding, +Flatus, -BM, +Formula feeding, +Breast feeding and pain is well controlled.  She denies any abdominal pain this am.    Denies headache, SOB, fever, chills and calf pain.    Vital Signs Last 24 Hrs  T(C): 36.7 (2018 20:02), Max: 37.4 (2018 14:58)  T(F): 98.1 (2018 20:02), Max: 99.3 (2018 14:58)  HR: 87 (2018 20:02) (71 - 99)  BP: 103/67 (2018 20:02) (92/52 - 123/50)  RR: 20 (2018 20:02) (16 - 20)      Physical Exam:  General: NAD  CVS: RRR, +S1/S2  Lungs: CTAB, no wheeze, ronchi or rales.   Breast: No tenderness or abnormal discharge.  Abdomen: +BS, soft, ND, minimally tender, Fundus firm at level of umbilicus.   Pelvic: Minimal lochia  Ext: No cyanosis, edema or calf tenderness.     Labs:                        10.7   7.5   )-----------( 178      ( 2018 21:03 )             32.6     Urinalysis Basic - ( 2018 20:07 )    Color: Yellow / Appearance: Slightly Turbid / S.015 / pH: x  Gluc: x / Ketone: Negative  / Bili: Negative / Urobili: Negative mg/dL   Blood: x / Protein: Negative mg/dL / Nitrite: Negative   Leuk Esterase: Small / RBC: 3-5 /HPF / WBC 11-25   Sq Epi: x / Non Sq Epi: Many / Bacteria: Moderate      MEDICATIONS  (STANDING):  citric acid/sodium citrate Solution 30 milliLiter(s) Oral once  diphtheria/tetanus/pertussis (acellular) Vaccine (ADAcel) 0.5 milliLiter(s) IntraMuscular once  lactated ringers Bolus 500 milliLiter(s) IV Bolus once  oxytocin Infusion 41.667 milliUNIT(s)/Min (125 mL/Hr) IV Continuous <Continuous>  oxytocin Infusion 2 milliUNIT(s)/Min (2 mL/Hr) IV Continuous <Continuous>  penicillin   G  potassium  IVPB      penicillin   G  potassium  IVPB 2.5 Million Unit(s) IV Intermittent every 4 hours  prenatal multivitamin 1 Tablet(s) Oral daily  sodium chloride 0.9% lock flush 3 milliLiter(s) IV Push every 8 hours    MEDICATIONS  (PRN):  acetaminophen   Tablet 650 milliGRAM(s) Oral every 6 hours PRN For Temp greater than 38.5 C (101.3 F)  acetaminophen   Tablet. 650 milliGRAM(s) Oral every 6 hours PRN Mild Pain  dibucaine 1% Ointment 1 Application(s) Topical every 4 hours PRN Perineal Discomfort  diphenhydrAMINE   Capsule 25 milliGRAM(s) Oral every 6 hours PRN Itching  docusate sodium 100 milliGRAM(s) Oral two times a day PRN Stool Softening  glycerin Suppository - Adult 1 Suppository(s) Rectal at bedtime PRN Constipation  hydrocortisone 1% Cream 1 Application(s) Topical every 4 hours PRN Moderate to Severe Perineal Pain  ibuprofen  Tablet 600 milliGRAM(s) Oral every 6 hours PRN Moderate Pain  lanolin Ointment 1 Application(s) Topical every 6 hours PRN Sore Nipples  magnesium hydroxide Suspension 30 milliLiter(s) Oral two times a day PRN Constipation  oxyCODONE    5 mG/acetaminophen 325 mG 2 Tablet(s) Oral every 6 hours PRN Severe Pain  pramoxine 1%/zinc 5% Cream 1 Application(s) Topical every 4 hours PRN Moderate to Severe Perineal Pain  simethicone 80 milliGRAM(s) Chew every 6 hours PRN Gas  witch hazel Pads 1 Application(s) Topical every 4 hours PRN Perineal Discomfort

## 2018-04-29 VITALS
SYSTOLIC BLOOD PRESSURE: 124 MMHG | TEMPERATURE: 98 F | RESPIRATION RATE: 20 BRPM | DIASTOLIC BLOOD PRESSURE: 80 MMHG | HEART RATE: 104 BPM

## 2018-04-29 RX ADMIN — Medication 600 MILLIGRAM(S): at 10:37

## 2018-04-29 RX ADMIN — Medication 1 TABLET(S): at 12:20

## 2018-04-29 NOTE — PROGRESS NOTE ADULT - ATTENDING COMMENTS
I have personally examined and counseled this patient. I agree with documentation. PP precautions discussed. Discharge home today.
post  day # 1  no complaints  exam wnl    Plan:   routine post partum care  cbc  discussed contraception, vaccination, breastfeeding  24 hr dc

## 2018-04-29 NOTE — PROGRESS NOTE ADULT - ASSESSMENT
23y year old  s/p  at 39 1/7 wks gestation PPD# 2.     Plan  C/w postpartum care  Encourage ambulation & hydration  Encourage breastfeeding, mother/baby interaction  Pain management PRN  Plan for discharge on PPD 2 unless otherwise specified, with postpartum follow up at the office in 6 weeks upon discharge.  PPD prophylaxis: early ambulation

## 2018-04-29 NOTE — PROGRESS NOTE ADULT - SUBJECTIVE AND OBJECTIVE BOX
23y year old  s/p  at 39 1/7 wks gestation PPD# 2.   Patient seen and examined at bedside, no acute overnight events.   Patient is ambulating, +eating, +PO hydration, +voiding, +Flatus, +M, +Formula feeding, +Breast feeding and pain is well controlled.  Denies headache, SOB, fever, chills and calf pain. Patient used 4 pads in 24 hrs, partially soaked    VS:   Vital Signs Last 24 Hrs  T(C): 36.8 (2018 21:22), Max: 36.8 (2018 21:22)  T(F): 98.3 (2018 21:22), Max: 98.3 (2018 21:22)  HR: 77 (2018 21:22) (77 - 80)  BP: 93/55 (2018 21:22) (93/55 - 99/67)  RR: 18 (2018 21:22) (18 - 20)  SpO2: 98% (2018 21:22) (98% - 98%)    Physical Exam:  General: NAD  CVS: RRR, +S1/S2  Lungs: CTAB, no wheeze, ronchi or rales.   Abdomen: +BS, soft, ND, minimally tender, Fundus firm at level of umbilicus.   Pelvic: Minimal lochia  Ext: No cyanosis, edema or calf tenderness.     Labs:                        9.3    6.9   )-----------( 150      ( 2018 07:03 )             29.0         MEDICATIONS  (STANDING):  citric acid/sodium citrate Solution 30 milliLiter(s) Oral once  diphtheria/tetanus/pertussis (acellular) Vaccine (ADAcel) 0.5 milliLiter(s) IntraMuscular once  lactated ringers Bolus 500 milliLiter(s) IV Bolus once  oxytocin Infusion 41.667 milliUNIT(s)/Min (125 mL/Hr) IV Continuous <Continuous>  oxytocin Infusion 2 milliUNIT(s)/Min (2 mL/Hr) IV Continuous <Continuous>  prenatal multivitamin 1 Tablet(s) Oral daily  sodium chloride 0.9% lock flush 3 milliLiter(s) IV Push every 8 hours    MEDICATIONS  (PRN):  acetaminophen   Tablet 650 milliGRAM(s) Oral every 6 hours PRN For Temp greater than 38.5 C (101.3 F)  acetaminophen   Tablet. 650 milliGRAM(s) Oral every 6 hours PRN Mild Pain  dibucaine 1% Ointment 1 Application(s) Topical every 4 hours PRN Perineal Discomfort  diphenhydrAMINE   Capsule 25 milliGRAM(s) Oral every 6 hours PRN Itching  docusate sodium 100 milliGRAM(s) Oral two times a day PRN Stool Softening  glycerin Suppository - Adult 1 Suppository(s) Rectal at bedtime PRN Constipation  hydrocortisone 1% Cream 1 Application(s) Topical every 4 hours PRN Moderate to Severe Perineal Pain  ibuprofen  Tablet 600 milliGRAM(s) Oral every 6 hours PRN Moderate Pain  lanolin Ointment 1 Application(s) Topical every 6 hours PRN Sore Nipples  magnesium hydroxide Suspension 30 milliLiter(s) Oral two times a day PRN Constipation  oxyCODONE    5 mG/acetaminophen 325 mG 2 Tablet(s) Oral every 6 hours PRN Severe Pain  pramoxine 1%/zinc 5% Cream 1 Application(s) Topical every 4 hours PRN Moderate to Severe Perineal Pain  simethicone 80 milliGRAM(s) Chew every 6 hours PRN Gas  witch hazel Pads 1 Application(s) Topical every 4 hours PRN Perineal Discomfort            23y year old  s/p  at 39 1/7 wks gestation PPD# 2.     Plan  C/w postpartum care  Encourage ambulation & hydration  Encourage breastfeeding, mother/baby interaction  Pain management PRN  Plan for discharge on PPD 2 unless otherwise specified, with postpartum follow up at the office in 6 weeks upon discharge.  PPD prophylaxis: early ambulation 23y year old  s/p  at 39 1/7 wks gestation PPD# 2.   Patient seen and examined at bedside, no acute overnight events.   Patient is ambulating, +eating, +PO hydration, +voiding, +Flatus, +M, +Formula feeding, +Breast feeding and pain is well controlled.  Denies headache, SOB, fever, chills and calf pain. Patient used 4 pads in 24 hrs, partially soaked    VS:   Vital Signs Last 24 Hrs  T(C): 36.8 (2018 21:22), Max: 36.8 (2018 21:22)  T(F): 98.3 (2018 21:22), Max: 98.3 (2018 21:22)  HR: 77 (2018 21:22) (77 - 80)  BP: 93/55 (2018 21:22) (93/55 - 99/67)  RR: 18 (2018 21:22) (18 - 20)  SpO2: 98% (2018 21:22) (98% - 98%)    Physical Exam:  General: NAD  CVS: RRR, +S1/S2  Lungs: CTAB, no wheeze, ronchi or rales.   Abdomen: +BS, soft, ND, minimally tender, Fundus firm at level of umbilicus.   Pelvic: Minimal lochia  Ext: No cyanosis, edema or calf tenderness, no CT    Labs:                        9.3    6.9   )-----------( 150      ( 2018 07:03 )             29.0         MEDICATIONS  (STANDING):  citric acid/sodium citrate Solution 30 milliLiter(s) Oral once  diphtheria/tetanus/pertussis (acellular) Vaccine (ADAcel) 0.5 milliLiter(s) IntraMuscular once  lactated ringers Bolus 500 milliLiter(s) IV Bolus once  oxytocin Infusion 41.667 milliUNIT(s)/Min (125 mL/Hr) IV Continuous <Continuous>  oxytocin Infusion 2 milliUNIT(s)/Min (2 mL/Hr) IV Continuous <Continuous>  prenatal multivitamin 1 Tablet(s) Oral daily  sodium chloride 0.9% lock flush 3 milliLiter(s) IV Push every 8 hours    MEDICATIONS  (PRN):  acetaminophen   Tablet 650 milliGRAM(s) Oral every 6 hours PRN For Temp greater than 38.5 C (101.3 F)  acetaminophen   Tablet. 650 milliGRAM(s) Oral every 6 hours PRN Mild Pain  dibucaine 1% Ointment 1 Application(s) Topical every 4 hours PRN Perineal Discomfort  diphenhydrAMINE   Capsule 25 milliGRAM(s) Oral every 6 hours PRN Itching  docusate sodium 100 milliGRAM(s) Oral two times a day PRN Stool Softening  glycerin Suppository - Adult 1 Suppository(s) Rectal at bedtime PRN Constipation  hydrocortisone 1% Cream 1 Application(s) Topical every 4 hours PRN Moderate to Severe Perineal Pain  ibuprofen  Tablet 600 milliGRAM(s) Oral every 6 hours PRN Moderate Pain  lanolin Ointment 1 Application(s) Topical every 6 hours PRN Sore Nipples  magnesium hydroxide Suspension 30 milliLiter(s) Oral two times a day PRN Constipation  oxyCODONE    5 mG/acetaminophen 325 mG 2 Tablet(s) Oral every 6 hours PRN Severe Pain  pramoxine 1%/zinc 5% Cream 1 Application(s) Topical every 4 hours PRN Moderate to Severe Perineal Pain  simethicone 80 milliGRAM(s) Chew every 6 hours PRN Gas  witch hazel Pads 1 Application(s) Topical every 4 hours PRN Perineal Discomfort            23y year old  s/p  at 39 1/7 wks gestation PPD# 2.     Plan  C/w postpartum care  Encourage ambulation & hydration  Encourage breastfeeding, mother/baby interaction  Pain management PRN  Plan for discharge on PPD 2 unless otherwise specified, with postpartum follow up at the office in 6 weeks upon discharge.  PPD prophylaxis: early ambulation

## 2018-05-23 PROCEDURE — 85027 COMPLETE CBC AUTOMATED: CPT

## 2018-05-23 PROCEDURE — 36415 COLL VENOUS BLD VENIPUNCTURE: CPT

## 2018-05-23 PROCEDURE — 86900 BLOOD TYPING SEROLOGIC ABO: CPT

## 2018-05-23 PROCEDURE — 86850 RBC ANTIBODY SCREEN: CPT

## 2018-05-23 PROCEDURE — 82803 BLOOD GASES ANY COMBINATION: CPT

## 2018-05-23 PROCEDURE — 86901 BLOOD TYPING SEROLOGIC RH(D): CPT

## 2018-05-23 PROCEDURE — 86780 TREPONEMA PALLIDUM: CPT

## 2018-05-23 PROCEDURE — 81001 URINALYSIS AUTO W/SCOPE: CPT

## 2023-02-22 ENCOUNTER — OFFICE (OUTPATIENT)
Dept: URBAN - METROPOLITAN AREA CLINIC 112 | Facility: CLINIC | Age: 28
Setting detail: OPHTHALMOLOGY
End: 2023-02-22
Payer: MEDICAID

## 2023-02-22 DIAGNOSIS — H16.223: ICD-10-CM

## 2023-02-22 DIAGNOSIS — J01.90: ICD-10-CM

## 2023-02-22 PROCEDURE — 92014 COMPRE OPH EXAM EST PT 1/>: CPT | Performed by: OPHTHALMOLOGY

## 2023-02-22 ASSESSMENT — CONFRONTATIONAL VISUAL FIELD TEST (CVF)
OD_FINDINGS: FULL
OS_FINDINGS: FULL

## 2023-02-22 ASSESSMENT — REFRACTION_AUTOREFRACTION
OS_SPHERE: 0.00
OS_CYLINDER: -0.50
OD_AXIS: 119
OS_AXIS: 074
OD_SPHERE: 0.00
OD_CYLINDER: -0.25

## 2023-02-22 ASSESSMENT — KERATOMETRY
OS_K2POWER_DIOPTERS: 44.00
OD_AXISANGLE_DEGREES: 094
OD_K2POWER_DIOPTERS: 44.00
OD_K1POWER_DIOPTERS: 43.50
METHOD_AUTO_MANUAL: AUTO
OS_K1POWER_DIOPTERS: 43.75
OS_AXISANGLE_DEGREES: 103

## 2023-02-22 ASSESSMENT — AXIALLENGTH_DERIVED
OD_AL: 23.5489
OS_AL: 23.5516

## 2023-02-22 ASSESSMENT — VISUAL ACUITY
OD_BCVA: 20/20
OS_BCVA: 20/20

## 2023-02-22 ASSESSMENT — SPHEQUIV_DERIVED
OS_SPHEQUIV: -0.25
OD_SPHEQUIV: -0.125

## 2023-03-10 ENCOUNTER — APPOINTMENT (OUTPATIENT)
Dept: OBGYN | Facility: CLINIC | Age: 28
End: 2023-03-10
Payer: MEDICAID

## 2023-03-10 PROCEDURE — 99213 OFFICE O/P EST LOW 20 MIN: CPT

## 2023-04-20 ENCOUNTER — APPOINTMENT (OUTPATIENT)
Dept: OBGYN | Facility: CLINIC | Age: 28
End: 2023-04-20
Payer: MEDICAID

## 2023-04-20 PROCEDURE — 36415 COLL VENOUS BLD VENIPUNCTURE: CPT

## 2023-04-20 PROCEDURE — 99213 OFFICE O/P EST LOW 20 MIN: CPT

## 2023-05-01 ENCOUNTER — APPOINTMENT (OUTPATIENT)
Dept: ANTEPARTUM | Facility: CLINIC | Age: 28
End: 2023-05-01
Payer: MEDICAID

## 2023-05-01 ENCOUNTER — ASOB RESULT (OUTPATIENT)
Age: 28
End: 2023-05-01

## 2023-05-01 ENCOUNTER — NON-APPOINTMENT (OUTPATIENT)
Age: 28
End: 2023-05-01

## 2023-05-01 PROCEDURE — ZZZZZ: CPT

## 2023-05-01 PROCEDURE — 76818 FETAL BIOPHYS PROFILE W/NST: CPT

## 2023-05-02 ENCOUNTER — NON-APPOINTMENT (OUTPATIENT)
Age: 28
End: 2023-05-02

## 2023-05-03 ENCOUNTER — NON-APPOINTMENT (OUTPATIENT)
Age: 28
End: 2023-05-03

## 2023-05-03 ENCOUNTER — OUTPATIENT (OUTPATIENT)
Dept: INPATIENT UNIT | Facility: HOSPITAL | Age: 28
LOS: 1 days | End: 2023-05-03
Payer: COMMERCIAL

## 2023-05-03 VITALS
RESPIRATION RATE: 18 BRPM | TEMPERATURE: 97 F | DIASTOLIC BLOOD PRESSURE: 60 MMHG | HEART RATE: 76 BPM | SYSTOLIC BLOOD PRESSURE: 102 MMHG

## 2023-05-03 VITALS — SYSTOLIC BLOOD PRESSURE: 102 MMHG | HEART RATE: 76 BPM | DIASTOLIC BLOOD PRESSURE: 60 MMHG

## 2023-05-03 DIAGNOSIS — O47.03 FALSE LABOR BEFORE 37 COMPLETED WEEKS OF GESTATION, THIRD TRIMESTER: ICD-10-CM

## 2023-05-03 NOTE — OB RN TRIAGE NOTE - PATIENT ON (OXYGEN DELIVERY METHOD)
Your Body mass index is 45.52 kg/m .  Weight management is a personal decision.  If you are interested in exploring weight loss strategies, the following discussion covers the approaches that may be successful. Body mass index (BMI) is one way to tell whether you are at a healthy weight, overweight, or obese. It measures your weight in relation to your height.  A BMI of 18.5 to 24.9 is in the healthy range. A person with a BMI of 25 to 29.9 is considered overweight, and someone with a BMI of 30 or greater is considered obese. More than two-thirds of American adults are considered overweight or obese.  Being overweight or obese increases the risk for further weight gain. Excess weight may lead to heart disease and diabetes.  Creating and following plans for healthy eating and physical activity may help you improve your health.  Weight control is part of healthy lifestyle and includes exercise, emotional health, and healthy eating habits. Careful eating habits lifelong are the mainstay of weight control. Though there are significant health benefits from weight loss, long-term weight loss with diet alone may be very difficult to achieve- studies show long-term success with dietary management in less than 10% of people. Attaining a healthy weight may be especially difficult to achieve in those with severe obesity. In some cases, medications, devices and surgical management might be considered.  What can you do?  If you are overweight or obese and are interested in methods for weight loss, you should discuss this with your provider.     Consider reducing daily calorie intake by 500 calories.     Keep a food journal.     Avoiding skipping meals, consider cutting portions instead.    Diet combined with exercise helps maintain muscle while optimizing fat loss. Strength training is particularly important for building and maintaining muscle mass. Exercise helps reduce stress, increase energy, and improves fitness.  Increasing exercise without diet control, however, may not burn enough calories to loose weight.       Start walking three days a week 10-20 minutes at a time    Work towards walking thirty minutes five days a week     Eventually, increase the speed of your walking for 1-2 minutes at time    In addition, we recommend that you review healthy lifestyles and methods for weight loss available through the National Institutes of Health patient information sites:  http://win.niddk.nih.gov/publications/index.htm    And look into health and wellness programs that may be available through your health insurance provider, employer, local community center, or yesenia club.           room air

## 2023-05-04 ENCOUNTER — APPOINTMENT (OUTPATIENT)
Dept: ANTEPARTUM | Facility: CLINIC | Age: 28
End: 2023-05-04
Payer: MEDICAID

## 2023-05-04 ENCOUNTER — APPOINTMENT (OUTPATIENT)
Dept: OBGYN | Facility: CLINIC | Age: 28
End: 2023-05-04
Payer: MEDICAID

## 2023-05-04 ENCOUNTER — ASOB RESULT (OUTPATIENT)
Age: 28
End: 2023-05-04

## 2023-05-04 VITALS
DIASTOLIC BLOOD PRESSURE: 66 MMHG | WEIGHT: 183 LBS | SYSTOLIC BLOOD PRESSURE: 100 MMHG | HEIGHT: 62 IN | BODY MASS INDEX: 33.68 KG/M2

## 2023-05-04 LAB
BILIRUB UR QL STRIP: NORMAL
GLUCOSE UR-MCNC: NORMAL
HCG UR QL: 0.2 EU/DL
HGB UR QL STRIP.AUTO: ABNORMAL
KETONES UR-MCNC: NORMAL
LEUKOCYTE ESTERASE UR QL STRIP: ABNORMAL
NITRITE UR QL STRIP: NORMAL
PH UR STRIP: 5.5
PROT UR STRIP-MCNC: NORMAL
SP GR UR STRIP: 1.02

## 2023-05-04 PROCEDURE — 76818 FETAL BIOPHYS PROFILE W/NST: CPT

## 2023-05-04 PROCEDURE — 99234 HOSP IP/OBS SM DT SF/LOW 45: CPT

## 2023-05-04 PROCEDURE — 76820 UMBILICAL ARTERY ECHO: CPT

## 2023-05-04 PROCEDURE — 99213 OFFICE O/P EST LOW 20 MIN: CPT | Mod: 25

## 2023-05-04 NOTE — OB PROVIDER TRIAGE NOTE - NSOBPROVIDERNOTE_OBGYN_ALL_OB_FT
28y  at 37w4d GA by first trimester sono who presents to L&D for mild abdominal cramping and inner thigh pain starting at 1900.    -VSS  - Fetal status reassuring; NST reactive  - Cervix closed  - Pain likely 2/2 nerve compression due to descent of the fetal head. - Recommend tylenol and heat packs as needed  - Follow up with Anny   - Scheduled for IOL next week; labor return precautions reviewed    Discussed with Dr. Banks 28y  at 37w4d GA by first trimester sono who presents to L&D for mild abdominal cramping and inner thigh pain starting at 1900.    -VSS  - Fetal status reassuring; NST reactive  - Cervix closed  - Pain likely 2/2 nerve compression due to descent of the fetal head. - Recommend tylenol and heat packs as needed  - Follow up with Anny   - Scheduled for IOL next week; labor return precautions reviewed    Discussed with Dr. Banks    Addendum:    Subjective Hx, Physical Exam, Laboratory & Imaging results reviewed.  I agree with the Resident Physician's assessment and plan of care, as discussed above.  Call, follow up, and return to Hospital parameters reviewed at length.  She was given the opportunity to ask questions and all were addressed.  Discharge home    Eleazar Banks, DO

## 2023-05-04 NOTE — OB PROVIDER TRIAGE NOTE - HISTORY OF PRESENT ILLNESS
28y  at 37w4d GA by first trimester sono who presents to L&D for mild abdominal cramping and inner thigh pain starting at 1900. Patient denies vaginal bleeding, contractions and leakage of fluid. She endorses good fetal movement. Denies fevers, chills, nausea, vomiting, chest pain, SOB, dizziness and headache. No other complaints at this time.   MIKAYLA:  23  LMP: 22  Prenatal course is significant for:  FGR 7%ile    POB:  2018 c/b SGA and oligohydramnios  PGYN: -fibroids, -ovarian cysts, denies STD hx, denies abnormal PAPs   PMH: Denies  PSH: Denies  SH: Denies EtOH, tobacco and illicit drug use during this pregnancy; feels safe at home   Meds: PNVs  Allergies: NKDA    BMI: 29.69  Sono: vertex, posterior  EFW: 2399g , AC 1%ile. EFW 7%ile

## 2023-05-04 NOTE — OB PROVIDER TRIAGE NOTE - NSHPPHYSICALEXAM_GEN_ALL_CORE
HR: 76 (05-03-23 @ 23:27) (76 - 76)  BP: 102/60 (05-03-23 @ 23:27) (102/60 - 102/60)      Gen: NAD, well-appearing, AAOx3   Abd: Soft, gravid  Ext: non-tender, non-edematous  SVE:  0/0/-3, head low and cervix is behind fetal head  Bedside sono: cephalic, head low in pelvis  FHT: 140bpm baseline, moderate variability, + accelerations, no decelerations  [place]: irregular, infrequent

## 2023-05-06 RX ORDER — OXYTOCIN 10 UNIT/ML
333.33 VIAL (ML) INJECTION
Qty: 20 | Refills: 0 | Status: COMPLETED | OUTPATIENT
Start: 2023-05-08 | End: 2023-05-08

## 2023-05-06 RX ORDER — CHLORHEXIDINE GLUCONATE 213 G/1000ML
1 SOLUTION TOPICAL ONCE
Refills: 0 | Status: DISCONTINUED | OUTPATIENT
Start: 2023-05-08 | End: 2023-05-08

## 2023-05-06 RX ORDER — CITRIC ACID/SODIUM CITRATE 300-500 MG
30 SOLUTION, ORAL ORAL ONCE
Refills: 0 | Status: DISCONTINUED | OUTPATIENT
Start: 2023-05-08 | End: 2023-05-08

## 2023-05-06 RX ORDER — SODIUM CHLORIDE 9 MG/ML
1000 INJECTION, SOLUTION INTRAVENOUS
Refills: 0 | Status: DISCONTINUED | OUTPATIENT
Start: 2023-05-08 | End: 2023-05-08

## 2023-05-08 ENCOUNTER — TRANSCRIPTION ENCOUNTER (OUTPATIENT)
Age: 28
End: 2023-05-08

## 2023-05-08 ENCOUNTER — INPATIENT (INPATIENT)
Facility: HOSPITAL | Age: 28
LOS: 1 days | Discharge: HOME | End: 2023-05-10
Attending: OBSTETRICS & GYNECOLOGY | Admitting: OBSTETRICS & GYNECOLOGY
Payer: COMMERCIAL

## 2023-05-08 ENCOUNTER — APPOINTMENT (OUTPATIENT)
Dept: ANTEPARTUM | Facility: CLINIC | Age: 28
End: 2023-05-08

## 2023-05-08 ENCOUNTER — APPOINTMENT (OUTPATIENT)
Dept: OBGYN | Facility: HOSPITAL | Age: 28
End: 2023-05-08

## 2023-05-08 ENCOUNTER — RESULT REVIEW (OUTPATIENT)
Age: 28
End: 2023-05-08

## 2023-05-08 VITALS
HEART RATE: 71 BPM | RESPIRATION RATE: 16 BRPM | DIASTOLIC BLOOD PRESSURE: 67 MMHG | TEMPERATURE: 98 F | WEIGHT: 182.98 LBS | SYSTOLIC BLOOD PRESSURE: 110 MMHG | HEIGHT: 64 IN

## 2023-05-08 DIAGNOSIS — O36: ICD-10-CM

## 2023-05-08 LAB
ALBUMIN SERPL ELPH-MCNC: 3.5 G/DL — SIGNIFICANT CHANGE UP (ref 3.3–5.2)
ALP SERPL-CCNC: 118 U/L — SIGNIFICANT CHANGE UP (ref 40–120)
ALT FLD-CCNC: 9 U/L — SIGNIFICANT CHANGE UP
ANION GAP SERPL CALC-SCNC: 12 MMOL/L — SIGNIFICANT CHANGE UP (ref 5–17)
APPEARANCE UR: CLEAR — SIGNIFICANT CHANGE UP
APTT BLD: 24.8 SEC — LOW (ref 27.5–35.5)
AST SERPL-CCNC: 16 U/L — SIGNIFICANT CHANGE UP
BACTERIA # UR AUTO: NEGATIVE — SIGNIFICANT CHANGE UP
BASOPHILS # BLD AUTO: 0.05 K/UL — SIGNIFICANT CHANGE UP (ref 0–0.2)
BASOPHILS NFR BLD AUTO: 0.5 % — SIGNIFICANT CHANGE UP (ref 0–2)
BILIRUB SERPL-MCNC: 0.3 MG/DL — LOW (ref 0.4–2)
BILIRUB UR-MCNC: NEGATIVE — SIGNIFICANT CHANGE UP
BLD GP AB SCN SERPL QL: SIGNIFICANT CHANGE UP
BUN SERPL-MCNC: 9.4 MG/DL — SIGNIFICANT CHANGE UP (ref 8–20)
CALCIUM SERPL-MCNC: 8.5 MG/DL — SIGNIFICANT CHANGE UP (ref 8.4–10.5)
CHLORIDE SERPL-SCNC: 107 MMOL/L — SIGNIFICANT CHANGE UP (ref 96–108)
CO2 SERPL-SCNC: 19 MMOL/L — LOW (ref 22–29)
COLOR SPEC: YELLOW — SIGNIFICANT CHANGE UP
CREAT ?TM UR-MCNC: 17 MG/DL — SIGNIFICANT CHANGE UP
CREAT SERPL-MCNC: 0.5 MG/DL — SIGNIFICANT CHANGE UP (ref 0.5–1.3)
DIFF PNL FLD: NEGATIVE — SIGNIFICANT CHANGE UP
EGFR: 133 ML/MIN/1.73M2 — SIGNIFICANT CHANGE UP
EOSINOPHIL # BLD AUTO: 0.04 K/UL — SIGNIFICANT CHANGE UP (ref 0–0.5)
EOSINOPHIL NFR BLD AUTO: 0.4 % — SIGNIFICANT CHANGE UP (ref 0–6)
EPI CELLS # UR: SIGNIFICANT CHANGE UP
FIBRINOGEN PPP-MCNC: 371 MG/DL — SIGNIFICANT CHANGE UP (ref 200–450)
GLUCOSE SERPL-MCNC: 90 MG/DL — SIGNIFICANT CHANGE UP (ref 70–99)
GLUCOSE UR QL: NEGATIVE MG/DL — SIGNIFICANT CHANGE UP
HCT VFR BLD CALC: 34.6 % — SIGNIFICANT CHANGE UP (ref 34.5–45)
HGB BLD-MCNC: 12 G/DL — SIGNIFICANT CHANGE UP (ref 11.5–15.5)
IMM GRANULOCYTES NFR BLD AUTO: 3.9 % — HIGH (ref 0–0.9)
INR BLD: 0.94 RATIO — SIGNIFICANT CHANGE UP (ref 0.88–1.16)
KETONES UR-MCNC: NEGATIVE — SIGNIFICANT CHANGE UP
LDH SERPL L TO P-CCNC: 182 U/L — SIGNIFICANT CHANGE UP (ref 98–192)
LEUKOCYTE ESTERASE UR-ACNC: ABNORMAL
LYMPHOCYTES # BLD AUTO: 2.06 K/UL — SIGNIFICANT CHANGE UP (ref 1–3.3)
LYMPHOCYTES # BLD AUTO: 21.5 % — SIGNIFICANT CHANGE UP (ref 13–44)
MCHC RBC-ENTMCNC: 29.9 PG — SIGNIFICANT CHANGE UP (ref 27–34)
MCHC RBC-ENTMCNC: 34.7 GM/DL — SIGNIFICANT CHANGE UP (ref 32–36)
MCV RBC AUTO: 86.1 FL — SIGNIFICANT CHANGE UP (ref 80–100)
MONOCYTES # BLD AUTO: 0.53 K/UL — SIGNIFICANT CHANGE UP (ref 0–0.9)
MONOCYTES NFR BLD AUTO: 5.5 % — SIGNIFICANT CHANGE UP (ref 2–14)
NEUTROPHILS # BLD AUTO: 6.51 K/UL — SIGNIFICANT CHANGE UP (ref 1.8–7.4)
NEUTROPHILS NFR BLD AUTO: 68.2 % — SIGNIFICANT CHANGE UP (ref 43–77)
NITRITE UR-MCNC: NEGATIVE — SIGNIFICANT CHANGE UP
PH UR: 6 — SIGNIFICANT CHANGE UP (ref 5–8)
PLATELET # BLD AUTO: 184 K/UL — SIGNIFICANT CHANGE UP (ref 150–400)
POTASSIUM SERPL-MCNC: 3.8 MMOL/L — SIGNIFICANT CHANGE UP (ref 3.5–5.3)
POTASSIUM SERPL-SCNC: 3.8 MMOL/L — SIGNIFICANT CHANGE UP (ref 3.5–5.3)
PROT ?TM UR-MCNC: <4 MG/DL — SIGNIFICANT CHANGE UP (ref 0–12)
PROT SERPL-MCNC: 6 G/DL — LOW (ref 6.6–8.7)
PROT UR-MCNC: NEGATIVE — SIGNIFICANT CHANGE UP
PROT/CREAT UR-RTO: <0.2 RATIO — SIGNIFICANT CHANGE UP
PROTHROM AB SERPL-ACNC: 10.9 SEC — SIGNIFICANT CHANGE UP (ref 10.5–13.4)
RBC # BLD: 4.02 M/UL — SIGNIFICANT CHANGE UP (ref 3.8–5.2)
RBC # FLD: 12.3 % — SIGNIFICANT CHANGE UP (ref 10.3–14.5)
RBC CASTS # UR COMP ASSIST: SIGNIFICANT CHANGE UP /HPF (ref 0–4)
SODIUM SERPL-SCNC: 138 MMOL/L — SIGNIFICANT CHANGE UP (ref 135–145)
SP GR SPEC: 1.01 — SIGNIFICANT CHANGE UP (ref 1.01–1.02)
T PALLIDUM AB TITR SER: NEGATIVE — SIGNIFICANT CHANGE UP
URATE SERPL-MCNC: 4.3 MG/DL — SIGNIFICANT CHANGE UP (ref 2.4–5.7)
UROBILINOGEN FLD QL: NEGATIVE MG/DL — SIGNIFICANT CHANGE UP
WBC # BLD: 9.56 K/UL — SIGNIFICANT CHANGE UP (ref 3.8–10.5)
WBC # FLD AUTO: 9.56 K/UL — SIGNIFICANT CHANGE UP (ref 3.8–10.5)
WBC UR QL: SIGNIFICANT CHANGE UP /HPF (ref 0–5)

## 2023-05-08 PROCEDURE — 88307 TISSUE EXAM BY PATHOLOGIST: CPT | Mod: 26

## 2023-05-08 PROCEDURE — 59409 OBSTETRICAL CARE: CPT | Mod: U7

## 2023-05-08 RX ORDER — SODIUM CHLORIDE 9 MG/ML
3 INJECTION INTRAMUSCULAR; INTRAVENOUS; SUBCUTANEOUS EVERY 8 HOURS
Refills: 0 | Status: DISCONTINUED | OUTPATIENT
Start: 2023-05-08 | End: 2023-05-10

## 2023-05-08 RX ORDER — KETOROLAC TROMETHAMINE 30 MG/ML
30 SYRINGE (ML) INJECTION ONCE
Refills: 0 | Status: DISCONTINUED | OUTPATIENT
Start: 2023-05-08 | End: 2023-05-08

## 2023-05-08 RX ORDER — PRAMOXINE HYDROCHLORIDE 150 MG/15G
1 AEROSOL, FOAM RECTAL EVERY 4 HOURS
Refills: 0 | Status: DISCONTINUED | OUTPATIENT
Start: 2023-05-08 | End: 2023-05-10

## 2023-05-08 RX ORDER — AER TRAVELER 0.5 G/1
1 SOLUTION RECTAL; TOPICAL EVERY 4 HOURS
Refills: 0 | Status: DISCONTINUED | OUTPATIENT
Start: 2023-05-08 | End: 2023-05-10

## 2023-05-08 RX ORDER — OXYCODONE HYDROCHLORIDE 5 MG/1
5 TABLET ORAL
Refills: 0 | Status: DISCONTINUED | OUTPATIENT
Start: 2023-05-08 | End: 2023-05-10

## 2023-05-08 RX ORDER — OXYCODONE HYDROCHLORIDE 5 MG/1
5 TABLET ORAL ONCE
Refills: 0 | Status: DISCONTINUED | OUTPATIENT
Start: 2023-05-08 | End: 2023-05-10

## 2023-05-08 RX ORDER — OXYTOCIN 10 UNIT/ML
41.67 VIAL (ML) INJECTION
Qty: 20 | Refills: 0 | Status: DISCONTINUED | OUTPATIENT
Start: 2023-05-08 | End: 2023-05-10

## 2023-05-08 RX ORDER — DINOPROSTONE 10 MG/241MG
10 INSERT VAGINAL ONCE
Refills: 0 | Status: COMPLETED | OUTPATIENT
Start: 2023-05-08 | End: 2023-05-08

## 2023-05-08 RX ORDER — DIBUCAINE 1 %
1 OINTMENT (GRAM) RECTAL EVERY 6 HOURS
Refills: 0 | Status: DISCONTINUED | OUTPATIENT
Start: 2023-05-08 | End: 2023-05-10

## 2023-05-08 RX ORDER — LANOLIN
1 OINTMENT (GRAM) TOPICAL EVERY 6 HOURS
Refills: 0 | Status: DISCONTINUED | OUTPATIENT
Start: 2023-05-08 | End: 2023-05-10

## 2023-05-08 RX ORDER — TETANUS TOXOID, REDUCED DIPHTHERIA TOXOID AND ACELLULAR PERTUSSIS VACCINE, ADSORBED 5; 2.5; 8; 8; 2.5 [IU]/.5ML; [IU]/.5ML; UG/.5ML; UG/.5ML; UG/.5ML
0.5 SUSPENSION INTRAMUSCULAR ONCE
Refills: 0 | Status: DISCONTINUED | OUTPATIENT
Start: 2023-05-08 | End: 2023-05-10

## 2023-05-08 RX ORDER — HYDROCORTISONE 1 %
1 OINTMENT (GRAM) TOPICAL EVERY 6 HOURS
Refills: 0 | Status: DISCONTINUED | OUTPATIENT
Start: 2023-05-08 | End: 2023-05-10

## 2023-05-08 RX ORDER — OXYTOCIN 10 UNIT/ML
2 VIAL (ML) INJECTION
Qty: 30 | Refills: 0 | Status: DISCONTINUED | OUTPATIENT
Start: 2023-05-08 | End: 2023-05-10

## 2023-05-08 RX ORDER — IBUPROFEN 200 MG
600 TABLET ORAL EVERY 6 HOURS
Refills: 0 | Status: COMPLETED | OUTPATIENT
Start: 2023-05-08 | End: 2024-04-05

## 2023-05-08 RX ORDER — ACETAMINOPHEN 500 MG
1000 TABLET ORAL ONCE
Refills: 0 | Status: COMPLETED | OUTPATIENT
Start: 2023-05-08 | End: 2023-05-08

## 2023-05-08 RX ORDER — DIPHENHYDRAMINE HCL 50 MG
25 CAPSULE ORAL EVERY 6 HOURS
Refills: 0 | Status: DISCONTINUED | OUTPATIENT
Start: 2023-05-08 | End: 2023-05-10

## 2023-05-08 RX ORDER — SIMETHICONE 80 MG/1
80 TABLET, CHEWABLE ORAL EVERY 4 HOURS
Refills: 0 | Status: DISCONTINUED | OUTPATIENT
Start: 2023-05-08 | End: 2023-05-10

## 2023-05-08 RX ORDER — MAGNESIUM HYDROXIDE 400 MG/1
30 TABLET, CHEWABLE ORAL
Refills: 0 | Status: DISCONTINUED | OUTPATIENT
Start: 2023-05-08 | End: 2023-05-10

## 2023-05-08 RX ORDER — BENZOCAINE 10 %
1 GEL (GRAM) MUCOUS MEMBRANE EVERY 6 HOURS
Refills: 0 | Status: DISCONTINUED | OUTPATIENT
Start: 2023-05-08 | End: 2023-05-10

## 2023-05-08 RX ORDER — ACETAMINOPHEN 500 MG
975 TABLET ORAL
Refills: 0 | Status: DISCONTINUED | OUTPATIENT
Start: 2023-05-08 | End: 2023-05-10

## 2023-05-08 RX ADMIN — SODIUM CHLORIDE 125 MILLILITER(S): 9 INJECTION, SOLUTION INTRAVENOUS at 06:56

## 2023-05-08 RX ADMIN — Medication 1000 MILLIGRAM(S): at 19:31

## 2023-05-08 RX ADMIN — DINOPROSTONE 10 MILLIGRAM(S): 10 INSERT VAGINAL at 09:10

## 2023-05-08 RX ADMIN — Medication 30 MILLIGRAM(S): at 21:00

## 2023-05-08 RX ADMIN — SODIUM CHLORIDE 125 MILLILITER(S): 9 INJECTION, SOLUTION INTRAVENOUS at 11:30

## 2023-05-08 RX ADMIN — SODIUM CHLORIDE 125 MILLILITER(S): 9 INJECTION, SOLUTION INTRAVENOUS at 14:00

## 2023-05-08 RX ADMIN — Medication 30 MILLIGRAM(S): at 20:28

## 2023-05-08 RX ADMIN — Medication 400 MILLIGRAM(S): at 17:03

## 2023-05-08 RX ADMIN — Medication 1000 MILLIUNIT(S)/MIN: at 19:04

## 2023-05-08 RX ADMIN — SODIUM CHLORIDE 125 MILLILITER(S): 9 INJECTION, SOLUTION INTRAVENOUS at 18:13

## 2023-05-08 NOTE — DISCHARGE NOTE OB - PATIENT PORTAL LINK FT
You can access the FollowMyHealth Patient Portal offered by [facility] by registering at the following website: [email or web address]. By joining [facility]’s FollowMyHealth portal, you will also be able to view your health information using other applications (apps) compatible with our system.

## 2023-05-08 NOTE — DISCHARGE NOTE OB - CARE PLAN
1 Principal Discharge DX:	Normal spontaneous vaginal delivery  Assessment and plan of treatment:	Patient post-partum had an uncomplicated hospital course. Her pain was well controlled. She is tolerating a regular diet. She is ambulating independently. Labs and Vitals WNL upon discharge.    1) Please take ibuprofen and/or Tylenol as needed for pain as prescribed.  2) Nothing in the vagina for 6 weeks (including no sex, no tampons, and no douching).  3) Please call your doctor for a follow up your postpartum appointment in 2 weeks.  4) Please continue taking vitamins postpartum.   5) Please call the office sooner if you have heavy vaginal bleeding, severe abdominal pain, or fever > 100.4F.  6) You may resume regular daily activity as tolerated

## 2023-05-08 NOTE — OB PROVIDER H&P - HISTORY OF PRESENT ILLNESS
DAQUAN CHEUNG is a 27yo G at  (dated by c/w x trimester sono) presenting for     ROS:  Denies leakage of fluids, vaginal bleeding, painful contractions. Reports active fetal movement. ***  Denies fever, chills, nausea, vomiting. ***  Denies HA, RUQ pain, vision changes, increased leg swelling. ***    PNC @    OBhx: denies  Gyn: denies  PMH: denies  PSH: denies  Med: PNV  Allergies: NKDA    Vitals: ***  T(C): 36.8 (05-08-23 @ 07:28), Max: 36.8 (05-08-23 @ 07:28)  T(F): 98.24 (05-08-23 @ 07:28), Max: 98.24 (05-08-23 @ 07:28)  HR: 82 (05-08-23 @ 07:28) (71 - 82)  BP: 119/70 (05-08-23 @ 07:28) (110/67 - 119/70)  RR: 16 (05-08-23 @ 06:19) (16 - 16)  SpO2: --    General: AAOx3, NAD  Heart: RRR  Lungs: CTAB  Abd: Soft, nontender, gravid  SVE: ***    BMI: ***  BMI (kg/m2): 31.4 (05-08-23)      FHT: ***  [place]:  ***    MFM sono:   Bedside sono: ***      A/P:     - Admit to L&D for eIOL:        Admission labs        Consent        Induction method: ***  - Admit to L&D for CS:        Pre op labs        Consent        Antibiotics  - Labs:         GBS         Rub I/I        HIV        Syphilis        Blood type        Hb  - Maternal and fetal status reassuring, will continue to monitor ***  Cat I tracing  Irregular contractions  Cephalic, intact  For cervidil   Analgesia PRN    D/w Dr. Vera     Patient is a 28 year old  at 38w1d by first trimester US who presents to L&D for IOL for FGR. Denies leakage of fluids, vaginal bleeding, painful contractions. Reports active fetal movement.      MIKAYLA: 23   LMP: 8/15/22      Pregnancy course:   FGR EFW 7%ile, AC 1%ile     Obhx:  2018 SGA, oligohydramnios   PGYN: -fibroids, -ovarian cysts, denies STD hx, denies abnormal PAPs    PMH: Denies   PSH: Denies   SH: Dnies EtOH, tobacco and illicit drug use during this pregnancy; feels safe at home    Meds: PNVs   Allergies: NKDA      BMI: 29.69   Sono: vertex, posterior   EFW: 2399g , AC 1%ile. EFW 7%ile

## 2023-05-08 NOTE — OB PROVIDER LABOR PROGRESS NOTE - NS_OBIHIFHRDETAILS_OBGYN_ALL_OB_FT
150 bpm, moderate variability + accels no decels present
140 bpm, moderate variability + accels no decels present
150bpm mod variability +accels +variable decels
160bpm mod variability +accels -decels

## 2023-05-08 NOTE — OB PROVIDER H&P - ASSESSMENT
A/P: Patient is a 28 year old  at 38w1d by first trimester US who presents to L&D for IOL for FGR.    - Admit for IOL 2/2 FGR  Admission labs  Consent  GBS neg  Cat I tracing  Irregular contractions  Cephalic, intact  For cervidil   Analgesia PRN    D/w Dr. Vera
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
## 2023-05-08 NOTE — DISCHARGE NOTE OB - NS MD DC FALL RISK RISK
For information on Fall & Injury Prevention, visit: [email or web address] OR  [email or web address] OR  https://www.cdc.gov/steadi/patient.html

## 2023-05-08 NOTE — OB PROVIDER H&P - NSPRENATALGBS_OBGYN_ALL_OB_START_DATE
[place] [facility] DISCHARGE SUMMARY    PATIENT'S NAME: Jayne Nino  YOB: 1925  MEDICAL RECORD NUMBER:  [id number]    PRIMARY CARE PROVIDER AND CLINIC RESPONSIBLE AFTER TRANSFER: Dinora Jimenez [facility] [address] / [facility]     CODE STATUS/ADVANCE DIRECTIVES DISCUSSION:   DNR / DNI        Allergies   Allergen Reactions     Oxycodone      Pt states she takes it for back pain, only causes itching       TRANSFERRING PROVIDERS: YURIY Wild CNP, Milagro Robertson MD  DATE OF SNF ADMISSION:  April / 16 / 2018  DATE OF SNF (anticipated) DISCHARGE: May / 11 / 2018  DISCHARGE DISPOSITION: Non-[facility] Provider   Nursing Facility: [facility] stay 4/11/18 to 4/16/18.     Condition on Discharge:  Improving.  Function:  Ambulating 150-350 feet, Independent with ADLs, managing 5 stairs      DISCHARGE DIAGNOSIS:   1. Acute on chronic diastolic heart failure (H)    2. Moderate aortic stenosis    3. Paroxysmal atrial fibrillation (H)    4. Essential hypertension    5. Physical deconditioning        HPI Nursing Facility Course:  HPI information obtained from: facility chart records, facility staff, patient report and [facility] chart review.  Hospital stay was at  [facility] from 4/11/18 through 4/16/18. PMH of atrial fibrillation, HFpEF, HTN, and bioprosthetic aortic valve, mild/mod MR/TR, and CKD presented with 1 week of progressive dyspnea. She was treated for CHF exacerbation likely related to recently stopping metolazone.  Admitted to this facility for  rehab, medical management and nursing care.     Acute on chronic diastolic heart failure (H)  Moderate aortic stenosis  CHF compensated. She says her dyspnea is improved. No hypoxia. Ongoing edema which is not typical for her. Lymphedema therapy is seeing her, but being very conservative in order to avoid centralizing the  fluid into her lungs. They have ordered TG shape stockings. She is worried she will not be able to manage these at home, so she may not wear them.     Wt Readings from Last 4 Encounters:   05/09/18 152 lb (68.9 kg)   05/02/18 152 lb 8 oz (69.2 kg)   05/02/18 152 lb 8 oz (69.2 kg)   04/25/18 152 lb 8 oz (69.2 kg)       Paroxysmal atrial fibrillation (H)  HR 65-89. Current Coumadin dosing 2.5mg po Sun/Tues ; 5mg po all other days. INR 2.81 today.     Essential hypertension  Denies dizziness  BP readings range:  120/71  102/65  142/78  107/66  136/76  144/78  103/63  132/74  133/67  106/68  129/62  116/73    Physical deconditioning  Improving. Variability in ambulation is related to her LUNDBERG, activity tolerance. She wishes to return home with her son. Therapy wanted to do a home assessment and she declined, did not feel this was necessary. They want her to have home care as well, but she doesn't want this, she wants to be able to get out of the house. In discussing the home assessment, she is mostly concerned about being judged on the cleanliness of her home, says it needs to be painted. PT/OT results indicate ability to live independently. She does not have a history of frequent falling. She will likely be safe at home.      PAST MEDICAL HISTORY:  has a past medical history of A-fib (H); Breast cancer (H); Gout; Heart disease; High cholesterol; and Hypothyroid.    DISCHARGE MEDICATIONS:  Current Outpatient Prescriptions   Medication Sig Dispense Refill     acetaminophen (TYLENOL) 325 MG tablet Take 650 mg by mouth every 6 hours as needed  250 tablet      atorvastatin (LIPITOR) 80 MG tablet Take 80 mg by mouth At Bedtime       bumetanide (BUMEX) 1 MG tablet Take 2 tablets (2 mg) by mouth daily as needed For 5 lb weight gain 180 tablet 1     bumetanide (BUMEX) 2 MG tablet Take 1 tablet (2 mg) by mouth daily 60 tablet 0     camphor-menthol (DERMASARRA) 0.5-0.5 % LOTN Apply topically 2 times daily And apply PRN        carvedilol (COREG) 25 MG tablet Take 25 mg by mouth 2 times daily       DIGOXIN PO Take 125 mcg by mouth every 48 hours       famotidine (PEPCID) 20 MG tablet Take 20 mg by mouth daily        ferrous sulfate 140 (45 FE) MG TBCR Take 140 mg by mouth daily       levothyroxine (SYNTHROID, LEVOTHROID) 125 MCG tablet Take 1 tablet by mouth daily. 90 tablet 1     MELATONIN PO Take 3 mg by mouth At Bedtime       Menthol, Topical Analgesic, (ICY HOT EX) Externally apply topically 2 times daily Knees, shoulders, back       metolazone (ZAROXOLYN) 2.5 MG tablet Take 1 tablet (2.5 mg) by mouth daily as needed For 10 lb weight gain 90 tablet 1     Multiple Vitamins-Minerals (MULTIVITAMIN ADULT PO) Take 1 tablet by mouth daily       polyethylene glycol 0.4%- propylene glycol 0.3% (SYSTANE ULTRA) 0.4-0.3 % SOLN ophthalmic solution Place 1 drop into both eyes every hour as needed for dry eyes       VITAMIN D, CHOLECALCIFEROL, PO Take 1,000 Units by mouth daily        warfarin (COUMADIN) 5 MG tablet Take by mouth daily Take as directed per INR       warfarin (COUMADIN) 5 MG tablet 2.5mg by mouth every Tues and Sun.  5mg by mouth all other days 30 tablet        MEDICATION CHANGES/RATIONALE:   Melatonin added for insomnia    Controlled medications sent with patient:   not applicable/none     ROS:    10 point ROS of systems including Constitutional, Eyes, Respiratory, Cardiovascular, Gastroenterology, Genitourinary, Integumentary, Muscularskeletal, Psychiatric were all negative except for pertinent positives noted in my HPI.    Physical Exam:   Vitals: /71  Pulse 74  Temp 97.3  F (36.3  C)  Resp 16  Wt 152 lb (68.9 kg)  SpO2 98%  BMI 25.29 kg/m2  BMI= Body mass index is 25.29 kg/(m^2).  GENERAL APPEARANCE:  Alert, in no distress, oriented  ENT:  Mouth and posterior oropharynx normal, moist mucous membranes, normal hearing acuity  EYES:  EOM, conjunctivae, lids, pupils and irises normal  NECK:  No adenopathy,masses or  thyromegaly  RESP:  respiratory effort and palpation of chest normal, lungs clear to auscultation , no respiratory distress  CV:  Palpation and auscultation of heart done , peripheral edema 2+ in bilateral lower legs, legs firm, hair absent, irregular rhythm (afib), grade 3/6 murmur  ABDOMEN:  normal bowel sounds, soft, nontender, no hepatosplenomegaly or other masses  SKIN:  Inspection of skin and subcutaneous tissue baseline, Palpation of skin and subcutaneous tissue baseline  PSYCH:  oriented X 3, normal insight, judgement and memory, affect and mood normal      DISCHARGE PLAN:  Home with son, outpatient PT/OT per patient request  Patient instructed to follow-up with:  PCP in 7 days      Current [place] [facility] scheduled appointments:  No future appointments.    MTM referral needed and placed by this provider: No    Pending labs: none    SNF labs     CBC RESULTS:   Recent Labs   Lab Test  04/13/18   0617  04/11/18   1752   WBC  4.3  4.0   RBC  3.23*  3.65*   HGB  8.9*  10.2*   HCT  30.0*  33.6*   MCV  93  92   MCH  27.6  27.9   MCHC  29.7*  30.4*   RDW  16.7*  16.3*   PLT  139*  185       Last Basic Metabolic Panel:  Recent Labs   Lab Test  04/18/18   0604  04/15/18   0625   NA  140  140   POTASSIUM  3.7  3.7   CHLORIDE  105  106   NAVARRO  9.1  8.8   CO2  27  26   BUN  62*  57*   CR  1.44*  1.45*   GLC  97  102*       INR 2.81 today 5/9/18  Lab Results   Component Value Date    INR 2.86 04/25/2018    INR 2.67 04/18/2018    INR 2.81 04/16/2018    INR 2.55 04/15/2018    INR 2.24 04/14/2018    INR 2.12 04/13/2018    INR 2.47 04/12/2018    INR 2.92 04/11/2018       Discharge Treatments: none    TOTAL DISCHARGE TIME:   Greater than 30 minutes     Electronically signed by:  YURIY Wild CNP   [facility]  Pager: [phone]     20-Apr-2023

## 2023-05-08 NOTE — OB PROVIDER DELIVERY SUMMARY - NSSELHIDDEN_OBGYN_ALL_OB_FT
[NS_DeliveryAttending1_OBGYN_ALL_OB_FT:[id number]],[NS_DeliveryAssist1_OBGYN_ALL_OB_FT:[id number]=]

## 2023-05-08 NOTE — OB RN PATIENT PROFILE - FALL HARM RISK - UNIVERSAL INTERVENTIONS
Bed in lowest position, wheels locked, appropriate side rails in place/Call bell, personal items and telephone in reach/Instruct patient to call for assistance before getting out of bed or chair/Non-slip footwear when patient is out of bed/[place] to call system/Physically safe environment - no spills, clutter or unnecessary equipment/Purposeful Proactive Rounding/Room/bathroom lighting operational, light cord in reach

## 2023-05-08 NOTE — DISCHARGE NOTE OB - NURSING SECTION COMPLETE
pt c/o heavy vaginal bleeding started last night, pt states changed four pads overnight due to vaginal bleeding. on arrival to ED, pt states only spotting seen. pt also c/o weakness this morning. pt states feels better at this time after eating breakfast and taking Iron tablets. pt takes rx Iron tablets daily tablets for hx: vaginal bleeding 2 years ago. Patient/Caregiver provided printed discharge information.

## 2023-05-08 NOTE — OB PROVIDER LABOR PROGRESS NOTE - ASSESSMENT
Plan:  - VSS  - Reactive tracing  - Cervidil placed  - Reassess as clinically indicated  - Continuous FHT/[place]      Plan d/w Dr. Vera

## 2023-05-08 NOTE — OB PROVIDER DELIVERY SUMMARY - NSPROVIDERDELIVERYNOTE_OBGYN_ALL_OB_FT
Procedure: Normal vaginal delivery   Findings: Viable male infant delivered in cephalic presentation at 1901, placenta delivered at 1904. Apgar 9/9. Weight pending  EBL: 150cc        Patient felt rectal pressure and was found to be fully dilated, +1 station. She pushed effectively for 30 minutes. In conjunction with maternal effort, she delivered a viable male infant. Vertex delivered without difficulty, no nuchal cord noted, shoulders then delivered without difficulty. Delayed cord clamping performed for 30 seconds, after which time cord clamped and cut. Placental delivered spontaneously and intact.  Cord gases sent. Pitocin started. Perineum, cervix and vagina were inspected and no lacerations. Excellent hemostasis noted at conclusion

## 2023-05-08 NOTE — OB PROVIDER LABOR PROGRESS NOTE - ASSESSMENT
Plan:  - VSS  - Reactive tracing  - Cervidil removed due to uterine tachysystole  - Patient making appropriate cervical change, consider AROM with next exam  - Continuous FHT/[place]  - Maternal/fetal status reassuring      Plan d/w Dr. Vera

## 2023-05-08 NOTE — OB PROVIDER LABOR PROGRESS NOTE - ASSESSMENT
Patient with baseline of 155bpm prior to AROM. Afebrile without chills at that time.  AROM blood tinged @ 1630 with subsequent increase in baseline vs prolonged acceleration  IVF bolus initiated, ofirmev given prophylactically, and patient repositioned.  Will reassess in 10 min to determine if additional intervention necessary   Patient with baseline of 155bpm prior to AROM. Afebrile without chills at that time.  AROM blood tinged @ 1630 with subsequent increase in baseline vs prolonged acceleration.  IVF bolus initiated, ofirmev given prophylactically, and patient repositioned to right lateral.  Will reassess in 10 min to determine if additional intervention necessary.   Patient with baseline of 155bpm prior to AROM. Afebrile without chills at that time.  AROM blood tinged @ 1630 with subsequent increase in baseline vs prolonged acceleration.  IVF bolus initiated, ofirmev given prophylactically, and patient repositioned to right lateral.  Will reassess in 10 min to determine if additional intervention necessary.      -----------------------------------------------ADDENDUM@1720  Tachycardia resolved with IVF bolus, repositioning to right lateral, and ofirmev  Baseline now 150bpm, overall cat I tracing  Abhishek regularly, no pitocin indicated at this time  Will c/w monitoring

## 2023-05-08 NOTE — DISCHARGE NOTE OB - PLAN OF CARE
Patient post-partum had an uncomplicated hospital course. Her pain was well controlled. She is tolerating a regular diet. She is ambulating independently. Labs and Vitals WNL upon discharge.    1) Please take ibuprofen and/or Tylenol as needed for pain as prescribed.  2) Nothing in the vagina for 6 weeks (including no sex, no tampons, and no douching).  3) Please call your doctor for a follow up your postpartum appointment in 2 weeks.  4) Please continue taking vitamins postpartum.   5) Please call the office sooner if you have heavy vaginal bleeding, severe abdominal pain, or fever > 100.4F.  6) You may resume regular daily activity as tolerated

## 2023-05-08 NOTE — DISCHARGE NOTE OB - MEDICATION SUMMARY - MEDICATIONS TO CHANGE
ILL APPEARING I will SWITCH the dose or number of times a day I take the medications listed below when I get home from the hospital:  None

## 2023-05-08 NOTE — OB NEONATOLOGY/PEDIATRICIAN DELIVERY SUMMARY - NSPEDSNEONOTESA_OBGYN_ALL_OB_FT
BABY LORENA CHEUNG is a 38.1 wk term  born at 1899 on 23 to 29 yo  O+/GBS neg/  RPR NR/ HIV neg/ hepBSAg neg mom with adequate PNC.  No h/o DM or HTN.  H/o fetal growth restriction.  Mom is CMV IgM+  Prior child with Heart murmur/ Congenital Heart Disease [?]. Essentially normal fetal ECHO on current pregnancy; non-urgent f/u as outpatient recommended by Cardiology.  L&D: Mom admitted today for IOL for FGR.  AROM at 1630, 2.5 h PTD.  Afebrile mom.  NRFHTs 2/2 fetal tachycardia.  Upon delivery clear AF; vertex.  Spontaneous cry.  Routine care.  AS .  A/P: Early term  delivered vaginally. H/o FGR. NRFHTs in labor. CMV IgM+ mom.  Observe for respiratory distress.  Glucose monitoring as per guidelines.  Transition care to Hospitalist service  Recommend Urine CMV.  Needs non-urgent f/u with Peds Cardiology [SEE PRE-ANA CONSULT]  Getachew Kenny MD

## 2023-05-08 NOTE — OB RN DELIVERY SUMMARY - NSSELHIDDEN_OBGYN_ALL_OB_FT
[NS_DeliveryAttending1_OBGYN_ALL_OB_FT:[id number]],[NS_DeliveryAssist1_OBGYN_ALL_OB_FT:[id number]],[NS_DeliveryRN_OBGYN_ALL_OB_FT:[id number]=]

## 2023-05-08 NOTE — OB RN DELIVERY SUMMARY - NS_SEPSISRSKCALC_OBGYN_ALL_OB_FT
EOS calculated successfully. EOS Risk Factor: 0.5/1000 live births ([facility] national incidence); GA=38w1d; Temp=98.96; ROM=2.517; GBS='Negative'; Antibiotics='No antibiotics or any antibiotics < 2 hrs prior to birth'

## 2023-05-08 NOTE — OB PROVIDER H&P - ATTENDING COMMENTS
Patient seen and examined at bedside with me.  Agree with details of resident note.  Consent obtained.

## 2023-05-08 NOTE — OB PROVIDER H&P - CURRENT PREGNANCY COMPLICATIONS, OB PROFILE
SGA, CMV+, cardiac anomalies with previous baby/Fetal Anomalies/Other CMV+, cardiac anomalies with previous baby/Fetal Anomalies/Intrauterine Growth Restriction/Other

## 2023-05-08 NOTE — OB PROVIDER H&P - NSLOWPPHRISK_OBGYN_A_OB
No previous uterine incision/[place] Pregnancy/Less than or equal to 4 previous vaginal births/No known bleeding disorder/No history of postpartum hemorrhage

## 2023-05-08 NOTE — OB PROVIDER LABOR PROGRESS NOTE - NS_SUBJECTIVE/OBJECTIVE_OBGYN_ALL_OB_FT
Patient reporting pelvic pain and pressure  Epidural in place
Patient seen at bedside.  Resting comfortably after epidural placement
Patient resting comfortably with epidural in place
Patient seen at bedside.  No acute complaints

## 2023-05-09 ENCOUNTER — NON-APPOINTMENT (OUTPATIENT)
Age: 28
End: 2023-05-09

## 2023-05-09 ENCOUNTER — TRANSCRIPTION ENCOUNTER (OUTPATIENT)
Age: 28
End: 2023-05-09

## 2023-05-09 DIAGNOSIS — O35.2XX0 MATERNAL CARE FOR (SUSPECTED) HEREDITARY DISEASE IN FETUS, NOT APPLICABLE OR UNSPECIFIED: ICD-10-CM

## 2023-05-09 DIAGNOSIS — Z34.93 ENCOUNTER FOR SUPERVISION OF NORMAL PREGNANCY, UNSPECIFIED, THIRD TRIMESTER: ICD-10-CM

## 2023-05-09 DIAGNOSIS — Z3A.37 37 WEEKS GESTATION OF PREGNANCY: ICD-10-CM

## 2023-05-09 DIAGNOSIS — B96.89 ACUTE VAGINITIS: ICD-10-CM

## 2023-05-09 DIAGNOSIS — N76.0 ACUTE VAGINITIS: ICD-10-CM

## 2023-05-09 DIAGNOSIS — O09.299 SUPERVISION OF PREGNANCY WITH OTHER POOR REPRODUCTIVE OR OBSTETRIC HISTORY, UNSPECIFIED TRIMESTER: ICD-10-CM

## 2023-05-09 DIAGNOSIS — Z34.90 ENCOUNTER FOR SUPERVISION OF NORMAL PREGNANCY, UNSPECIFIED, UNSPECIFIED TRIMESTER: ICD-10-CM

## 2023-05-09 DIAGNOSIS — Z3A.36 36 WEEKS GESTATION OF PREGNANCY: ICD-10-CM

## 2023-05-09 LAB
BILIRUB UR QL STRIP: NORMAL
CLARITY UR: CLEAR
COLLECTION METHOD: NORMAL
GLUCOSE UR-MCNC: NORMAL
HCG UR QL: 0.2 EU/DL
HCT VFR BLD CALC: 29.9 % — LOW (ref 34.5–45)
HGB BLD-MCNC: 10.2 G/DL — LOW (ref 11.5–15.5)
HGB UR QL STRIP.AUTO: NORMAL
KETONES UR-MCNC: NORMAL
LEUKOCYTE ESTERASE UR QL STRIP: NORMAL
NITRITE UR QL STRIP: NORMAL
PH UR STRIP: 6.5
PROT UR STRIP-MCNC: NORMAL
SP GR UR STRIP: 1.02

## 2023-05-09 RX ORDER — IBUPROFEN 200 MG
1 TABLET ORAL
Qty: 28 | Refills: 0
Start: 2023-05-09 | End: 2023-05-15

## 2023-05-09 RX ORDER — ACETAMINOPHEN 500 MG
1 TABLET ORAL
Qty: 56 | Refills: 0
Start: 2023-05-09 | End: 2023-05-22

## 2023-05-09 RX ORDER — IBUPROFEN 200 MG
600 TABLET ORAL EVERY 6 HOURS
Refills: 0 | Status: DISCONTINUED | OUTPATIENT
Start: 2023-05-09 | End: 2023-05-10

## 2023-05-09 RX ADMIN — Medication 1 TABLET(S): at 12:42

## 2023-05-09 RX ADMIN — Medication 600 MILLIGRAM(S): at 12:42

## 2023-05-09 RX ADMIN — Medication 975 MILLIGRAM(S): at 03:59

## 2023-05-09 RX ADMIN — Medication 600 MILLIGRAM(S): at 05:34

## 2023-05-09 RX ADMIN — Medication 975 MILLIGRAM(S): at 15:11

## 2023-05-09 RX ADMIN — Medication 975 MILLIGRAM(S): at 21:08

## 2023-05-09 RX ADMIN — Medication 975 MILLIGRAM(S): at 10:03

## 2023-05-09 NOTE — DISCHARGE NOTE NURSING/CASE MANAGEMENT/SOCIAL WORK - NSDCPEFALRISK_GEN_ALL_CORE
For information on Fall & Injury Prevention, visit: [email or web address] OR  [email or web address] OR  https://www.cdc.gov/steadi/patient.html

## 2023-05-09 NOTE — DISCHARGE NOTE NURSING/CASE MANAGEMENT/SOCIAL WORK - PATIENT PORTAL LINK FT
You can access the FollowMyHealth Patient Portal offered by [facility] by registering at the following website: [email or web address]. By joining [employer]’s FollowMyHealth portal, you will also be able to view your health information using other applications (apps) compatible with our system.

## 2023-05-10 VITALS
SYSTOLIC BLOOD PRESSURE: 105 MMHG | TEMPERATURE: 99 F | DIASTOLIC BLOOD PRESSURE: 69 MMHG | OXYGEN SATURATION: 100 % | RESPIRATION RATE: 16 BRPM | HEART RATE: 94 BPM

## 2023-05-10 RX ADMIN — Medication 600 MILLIGRAM(S): at 12:41

## 2023-05-10 RX ADMIN — Medication 975 MILLIGRAM(S): at 03:43

## 2023-05-10 RX ADMIN — Medication 975 MILLIGRAM(S): at 08:46

## 2023-05-10 RX ADMIN — Medication 600 MILLIGRAM(S): at 05:56

## 2023-05-10 NOTE — PROGRESS NOTE ADULT - SUBJECTIVE AND OBJECTIVE BOX
28y Female s/p labor epidural on 05/08/2023    Vital Signs     T(C): 36.6 (09 May 2023 05:40), Max: 37.5 (08 May 2023 20:27)  T(F): 97.9 (09 May 2023 05:40), Max: 99.5 (08 May 2023 20:27)  HR: 68 (09 May 2023 05:40) (61 - 125)  BP: 103/65 (09 May 2023 05:40) (95/53 - 166/125)  BP(mean): --  RR: 16 (09 May 2023 05:40) (16 - 16)  SpO2: 100% (09 May 2023 05:40) (97% - 100%)            Patient's overall anesthesia satisfaction: Positive    Patient seen and doing well     No headache      No residual numbness or weakness, sensory and motor function inta    No anesthetic complications or complaints noted or reported                 
DAQUAN CHEUNG is a 28y  PPD2 s/p  @38w1d 2/ FGR, uncomplicated.     S:    No acute events overnight.   The patient has no complaints.  Pain controlled with current treatment regimen.   She is ambulating without difficulty and tolerating PO.   + flatus/-BM/+ voiding   She endorses appropriate lochia, which is decreasing.   She denies fevers, chills, nausea and vomiting.   She denies lightheadedness, dizziness, palpitations, chest pain and SOB.     O:    T(C): 37.2 (05-10-23 @ 04:36), Max: 37.2 (05-10-23 @ 04:36)  HR: 94 (05-10-23 @ 04:36) (68 - 94)  BP: 105/69 (05-10-23 @ 04:36) (102/70 - 105/69)  RR: 16 (05-10-23 @ 04:36) (16 - 16)  SpO2: 100% (05-10-23 @ 04:36) (98% - 100%)    Gen: NAD, AOx3  Breast: Nontender, non-engorged   Abdomen:  Soft, non-tender, non-distended  Uterus:  Fundus firm below umbilicus  VE:  Expectant lochia  Ext:  Non-tender and non-edematous                          10.2   x     )-----------( x        ( 09 May 2023 05:37 )             29.9         138  |  107  |  9.4  ----------------------------<  90  3.8   |  19.0<L>  |  0.50    Ca    8.5      08 May 2023 06:30    TPro  6.0<L>  /  Alb  3.5  /  TBili  0.3<L>  /  DBili  x   /  AST  16  /  ALT  9   /  AlkPhos  118      
DAQUAN CHEUNG is a 28y  s/p  @38w1d / FGR  PPD1    SUBJECTIVE:  No acute events overnight.  Patient has no complaints.  Pain is well controlled.  +flatus, + voiding.  Ambulating and tolerating PO.  Appropriate lochia.    OBJECTIVE:  Physical exam:  General: AOx3, NAD.  Abdomen: Soft, appropriately tender to palpitation, fundus firm.  Vaginal: expectant lochia  Ext: No DVT signs, warm extremities.    Vital Signs Last 24 Hrs  T(C): 36.6 (09 May 2023 05:40), Max: 37.5 (08 May 2023 20:27)  T(F): 97.9 (09 May 2023 05:40), Max: 99.5 (08 May 2023 20:27)  HR: 68 (09 May 2023 05:40) (61 - 125)  BP: 103/65 (09 May 2023 05:40) (95/53 - 166/125)  RR: 16 (09 May 2023 05:40) (16 - 16)  SpO2: 100% (09 May 2023 05:40) (97% - 100%)    LABS:                        12.0   9.56  )-----------( 184      ( 08 May 2023 06:30 )             34.6     05-08    138  |  107  |  9.4  ----------------------------<  90  3.8   |  19.0<L>  |  0.50    Ca    8.5      08 May 2023 06:30    TPro  6.0<L>  /  Alb  3.5  /  TBili  0.3<L>  /  DBili  x   /  AST  16  /  ALT  9   /  AlkPhos  118  05-08

## 2023-05-10 NOTE — PROGRESS NOTE ADULT - ASSESSMENT
DAQUAN CHEUNG is a 28y  PPD2 s/p  @38w1d 2/2 FGR, uncomplicated.     -Vital signs stable  -Hgb:12>10.2  -Voiding, tolerating PO, bowel function nml   -Advance care as tolerated   -Continue routine postpartum care and education  -Healthy male infant, declines circumcision  -Dispo: Pt is stable, doing well and meeting all postpartum and postoperative milestones. Possible discharge to home today pending attending approval.    
A/P:DAQUAN CHEUNG is a 28y  s/p  @38w1d 2/ FGR  PPD1    #Routine post partum care  - Stable, doing well postpartum, BPS postpartum wnl  - Hgb 12 > pending  - Pain: well controlled on PO pain meds  - GI: regular diet, normal bowel function  - : voiding , lochia decreasing  - DVT ppx: SCDs, ambulation encouraged  - Healthy baby boy, declines circ  - For SW for h/o depression/anxiety  - Dispo: for discharge home today pending SW and stable H/H

## 2023-05-11 ENCOUNTER — APPOINTMENT (OUTPATIENT)
Dept: ANTEPARTUM | Facility: CLINIC | Age: 28
End: 2023-05-11

## 2023-05-11 LAB
SURGICAL PATHOLOGY STUDY: SIGNIFICANT CHANGE UP

## 2023-05-15 ENCOUNTER — APPOINTMENT (OUTPATIENT)
Dept: ANTEPARTUM | Facility: CLINIC | Age: 28
End: 2023-05-15

## 2023-05-18 ENCOUNTER — APPOINTMENT (OUTPATIENT)
Dept: ANTEPARTUM | Facility: CLINIC | Age: 28
End: 2023-05-18

## 2023-06-19 LAB
BILIRUB UR QL STRIP: NORMAL
CLARITY UR: CLEAR
GLUCOSE UR-MCNC: NORMAL
HCG UR QL: 0.2 EU/DL
HGB UR QL STRIP.AUTO: NORMAL
KETONES UR-MCNC: NORMAL
LEUKOCYTE ESTERASE UR QL STRIP: NORMAL
NITRITE UR QL STRIP: NORMAL
PH UR STRIP: 5.5
PROT UR STRIP-MCNC: NORMAL
SP GR UR STRIP: 1.02

## 2023-06-20 ENCOUNTER — APPOINTMENT (OUTPATIENT)
Dept: OBGYN | Facility: CLINIC | Age: 28
End: 2023-06-20
Payer: MEDICAID

## 2023-06-20 ENCOUNTER — RESULT CHARGE (OUTPATIENT)
Age: 28
End: 2023-06-20

## 2023-06-20 VITALS
DIASTOLIC BLOOD PRESSURE: 60 MMHG | WEIGHT: 170.5 LBS | SYSTOLIC BLOOD PRESSURE: 100 MMHG | HEIGHT: 62 IN | BODY MASS INDEX: 31.38 KG/M2

## 2023-06-20 LAB
HCG UR QL: NEGATIVE
QUALITY CONTROL: YES

## 2023-06-20 PROCEDURE — 36415 COLL VENOUS BLD VENIPUNCTURE: CPT

## 2023-06-20 PROCEDURE — 81025 URINE PREGNANCY TEST: CPT

## 2023-06-20 PROCEDURE — 0503F POSTPARTUM CARE VISIT: CPT

## 2023-06-20 PROCEDURE — 99212 OFFICE O/P EST SF 10 MIN: CPT | Mod: 25

## 2023-06-21 LAB — HCG SERPL-MCNC: <1 MIU/ML

## 2023-07-03 ENCOUNTER — APPOINTMENT (OUTPATIENT)
Dept: OBGYN | Facility: CLINIC | Age: 28
End: 2023-07-03
Payer: MEDICAID

## 2023-07-03 ENCOUNTER — RESULT CHARGE (OUTPATIENT)
Age: 28
End: 2023-07-03

## 2023-07-03 VITALS
DIASTOLIC BLOOD PRESSURE: 74 MMHG | WEIGHT: 170 LBS | HEIGHT: 62 IN | BODY MASS INDEX: 31.28 KG/M2 | SYSTOLIC BLOOD PRESSURE: 112 MMHG

## 2023-07-03 DIAGNOSIS — Z87.42 PERSONAL HISTORY OF OTHER DISEASES OF THE FEMALE GENITAL TRACT: ICD-10-CM

## 2023-07-03 DIAGNOSIS — Z72.51 HIGH RISK HETEROSEXUAL BEHAVIOR: ICD-10-CM

## 2023-07-03 DIAGNOSIS — R76.8 OTHER SPECIFIED ABNORMAL IMMUNOLOGICAL FINDINGS IN SERUM: ICD-10-CM

## 2023-07-03 PROCEDURE — 58300 INSERT INTRAUTERINE DEVICE: CPT

## 2023-07-03 PROCEDURE — 81025 URINE PREGNANCY TEST: CPT

## 2023-07-06 ENCOUNTER — APPOINTMENT (OUTPATIENT)
Dept: ULTRASOUND IMAGING | Facility: CLINIC | Age: 28
End: 2023-07-06
Payer: MEDICAID

## 2023-07-06 ENCOUNTER — OUTPATIENT (OUTPATIENT)
Dept: OUTPATIENT SERVICES | Facility: HOSPITAL | Age: 28
LOS: 1 days | End: 2023-07-06
Payer: COMMERCIAL

## 2023-07-06 ENCOUNTER — RESULT REVIEW (OUTPATIENT)
Age: 28
End: 2023-07-06

## 2023-07-06 DIAGNOSIS — Z30.430 ENCOUNTER FOR INSERTION OF INTRAUTERINE CONTRACEPTIVE DEVICE: ICD-10-CM

## 2023-07-06 PROCEDURE — 76830 TRANSVAGINAL US NON-OB: CPT | Mod: 26

## 2023-07-06 PROCEDURE — 76830 TRANSVAGINAL US NON-OB: CPT

## 2023-07-06 PROCEDURE — 76856 US EXAM PELVIC COMPLETE: CPT | Mod: 26

## 2023-07-06 PROCEDURE — 76856 US EXAM PELVIC COMPLETE: CPT

## 2023-07-11 ENCOUNTER — APPOINTMENT (OUTPATIENT)
Dept: OBGYN | Facility: CLINIC | Age: 28
End: 2023-07-11
Payer: MEDICAID

## 2023-07-11 VITALS
BODY MASS INDEX: 31.19 KG/M2 | WEIGHT: 169.5 LBS | HEIGHT: 62 IN | DIASTOLIC BLOOD PRESSURE: 70 MMHG | SYSTOLIC BLOOD PRESSURE: 110 MMHG

## 2023-07-11 DIAGNOSIS — Z30.430 ENCOUNTER FOR INSERTION OF INTRAUTERINE CONTRACEPTIVE DEVICE: ICD-10-CM

## 2023-07-11 LAB
HCG UR QL: NEGATIVE
QUALITY CONTROL: YES

## 2023-07-11 PROCEDURE — 99213 OFFICE O/P EST LOW 20 MIN: CPT

## 2024-01-05 NOTE — OB RN PATIENT PROFILE - NS_CONTACTNUMBEROFSUPPORTPERSON_OBGYN_ALL_OB_FT
1/5/2024         RE: Refugio Davies  [address]  [place] [address]        Dear Colleague,    Thank you for referring your patient, Refugio Davies, to the [facility] [place]. Please see a copy of my visit note below.    Date of Service: Jan 5, 2024    Chief Complaint: Post operative follow up.     Date of Surgery: 12/22/23    Procedure Performed: Left in-situ cubital tunnel release      Interval events: Refugio Davies is a 59 year old male who presents today for a postoperative follow up. He is doing well. Pain well controlled. N/T improved post operatively.     The past medical history was reviewed updated in the EMR. This includes medications, surgeries, social history, and review of systems.    Physical examination:  Well-developed, well-nourished and in no acute distress.  Alert and oriented to surroundings.  On examination of the  left elbow, incision is well-healed. There is no erythema, drainage, or dehiscence. Swelling is Mild. Sensation is intact in median, radial and ulnar nerve distributions. Fires EPL, FPL, IO with 5/5. Patient can actively flex and extend all digits and thumb.  Fingers are warm and well-perfused.    Assessment: 59 year old male s/p left in-situ cubital tunnel release, progressing appropriately.     Plan:  Can start to get the incision wet. No submerging until the wound is fully healed. Start gentle active ROM. Once incision is healed can progress activities. Follow up at 4-6 weeks post op.     PRINCE ALICIA PA-C  Orthopaedic Surgery       [phone]

## 2024-01-17 PROBLEM — K21.9 GASTRO-ESOPHAGEAL REFLUX DISEASE WITHOUT ESOPHAGITIS: Chronic | Status: ACTIVE | Noted: 2023-05-08

## 2024-01-24 ENCOUNTER — APPOINTMENT (OUTPATIENT)
Dept: OBGYN | Facility: CLINIC | Age: 29
End: 2024-01-24
Payer: MEDICAID

## 2024-01-24 VITALS
BODY MASS INDEX: 29.81 KG/M2 | HEIGHT: 62 IN | DIASTOLIC BLOOD PRESSURE: 68 MMHG | SYSTOLIC BLOOD PRESSURE: 102 MMHG | WEIGHT: 162 LBS

## 2024-01-24 DIAGNOSIS — R10.2 PELVIC AND PERINEAL PAIN: ICD-10-CM

## 2024-01-24 PROCEDURE — 99213 OFFICE O/P EST LOW 20 MIN: CPT

## 2024-01-24 RX ORDER — NAPROXEN 500 MG/1
500 TABLET ORAL
Qty: 60 | Refills: 2 | Status: ACTIVE | COMMUNITY
Start: 2024-01-24 | End: 1900-01-01

## 2024-01-24 NOTE — PHYSICAL EXAM
[Appropriately responsive] : appropriately responsive [Alert] : alert [No Acute Distress] : no acute distress [Regular Rate Rhythm] : regular rate rhythm [Soft] : soft [Non-tender] : non-tender [No HSM] : No HSM [Non-distended] : non-distended [No Lesions] : no lesions [No Mass] : no mass [Oriented x3] : oriented x3 [Labia Minora] : normal [Labia Majora] : normal [Moderate] : There was moderate vaginal bleeding [IUD String] : an IUD string was noted [Normal] : normal [Uterine Adnexae] : normal

## 2024-01-24 NOTE — COUNSELING
[Nutrition/ Exercise/ Weight Management] : nutrition, exercise, weight management [Body Image] : body image [Vitamins/Supplements] : vitamins/supplements [Breast Self Exam] : breast self exam [Contraception/ Emergency Contraception/ Safe Sexual Practices] : contraception, emergency contraception, safe sexual practices [Confidentiality] : confidentiality [STD (testing, results, tx)] : STD (testing, results, tx) [Medication Management] : medication management [Lab Results] : lab results

## 2024-01-24 NOTE — HISTORY OF PRESENT ILLNESS
[FreeTextEntry1] : 29yo P2 presents with pelvic pain when she is on her menses and is worried about IUD. Patient had paragard IUD inserted in 7/3023 and happy with IUD. Reports bleeding today and passed a few clots. Admits to pain today when she is sitting.  LMP 01/23/2024

## 2024-01-24 NOTE — PLAN
[FreeTextEntry1] : PLAN  CG/CT-obtained today Pelvic sono referral given to assess IUD position. Dicsussed the strings look longer than insertion time. Its possible IUD moved. Discussed we may need replacement of IUD at next visit depending on the resutls of pelvic sono  RTO 2-3 weeks for results of pelvic sono and annual

## 2024-01-26 LAB
C TRACH RRNA SPEC QL NAA+PROBE: NOT DETECTED
N GONORRHOEA RRNA SPEC QL NAA+PROBE: NOT DETECTED
SOURCE AMPLIFICATION: NORMAL

## 2024-01-31 ENCOUNTER — APPOINTMENT (OUTPATIENT)
Dept: ULTRASOUND IMAGING | Facility: CLINIC | Age: 29
End: 2024-01-31
Payer: MEDICAID

## 2024-01-31 ENCOUNTER — OUTPATIENT (OUTPATIENT)
Dept: OUTPATIENT SERVICES | Facility: HOSPITAL | Age: 29
LOS: 1 days | End: 2024-01-31
Payer: COMMERCIAL

## 2024-01-31 DIAGNOSIS — R10.2 PELVIC AND PERINEAL PAIN: ICD-10-CM

## 2024-01-31 PROCEDURE — 76830 TRANSVAGINAL US NON-OB: CPT

## 2024-01-31 PROCEDURE — 76856 US EXAM PELVIC COMPLETE: CPT

## 2024-01-31 PROCEDURE — 76830 TRANSVAGINAL US NON-OB: CPT | Mod: 26

## 2024-01-31 PROCEDURE — 76856 US EXAM PELVIC COMPLETE: CPT | Mod: 26

## 2024-02-07 ENCOUNTER — APPOINTMENT (OUTPATIENT)
Dept: OBGYN | Facility: CLINIC | Age: 29
End: 2024-02-07

## 2024-02-07 ENCOUNTER — APPOINTMENT (OUTPATIENT)
Dept: OBGYN | Facility: CLINIC | Age: 29
End: 2024-02-07
Payer: MEDICAID

## 2024-02-07 VITALS — BODY MASS INDEX: 29.26 KG/M2 | SYSTOLIC BLOOD PRESSURE: 115 MMHG | DIASTOLIC BLOOD PRESSURE: 75 MMHG | WEIGHT: 160 LBS

## 2024-02-07 DIAGNOSIS — Z97.5 PRESENCE OF (INTRAUTERINE) CONTRACEPTIVE DEVICE: ICD-10-CM

## 2024-02-07 DIAGNOSIS — N94.6 DYSMENORRHEA, UNSPECIFIED: ICD-10-CM

## 2024-02-07 LAB — HCG UR QL: NEGATIVE

## 2024-02-07 PROCEDURE — 81025 URINE PREGNANCY TEST: CPT

## 2024-02-07 PROCEDURE — 58301 REMOVE INTRAUTERINE DEVICE: CPT

## 2024-02-07 PROCEDURE — 58300 INSERT INTRAUTERINE DEVICE: CPT

## 2024-02-07 RX ORDER — NAPROXEN 500 MG/1
500 TABLET ORAL
Qty: 60 | Refills: 2 | Status: ACTIVE | COMMUNITY
Start: 2024-02-07 | End: 1900-01-01

## 2024-02-07 NOTE — PROCEDURE
[IUD Placement] : intrauterine device (IUD) placement [Prevention of Pregnancy] : prevention of pregnancy [Infection] : infection [Bleeding] : bleeding [Pain] : pain [Expulsion] : expulsion [Failure] : failure [Uterine Perforation] : uterine perforation [Neg Pregnancy Test] : negative pregnancy test [Betadine] : Betadine [Tenaculum] : Tenaculum [ParaGard IUD] : ParaGard IUD [None] : None [IUD Removal] : intrauterine device (IUD) removal [Time out performed] : Pre-procedure time out performed.  Patient's name, date of birth and procedure confirmed. [Consent Obtained] : Consent obtained [Dysmenorrhea] : dysmenorrhea [Risks] : risks [Benefits] : benefits [Alternatives] : alternatives [Patient] : patient [Speculum Placed] : speculum placed [Strings Visualized] : strings visualized [IUD Discarded] : IUD discarded [Sent to Pathology] : specimen was placed in buffered formalin and sent for pathology [Tolerated Well] : Patient tolerated the procedure well [No Complications] : no complications [Heavy Vaginal Bleeding] : for heavy vaginal bleeding [Pelvic Pain] : for pelvic pain [de-identified] : [id number] [de-identified] : 04/2029 [de-identified] : 04/2039

## 2024-02-07 NOTE — ASSESSMENT
[FreeTextEntry1] : PLAN Negative pregnancy test Consent signed Reviewed R/B/A of IUD paragard  TVS referral given to assess for IUD position  RTO 6 weeks for Annual and IUD check

## 2024-02-08 RX ORDER — IBUPROFEN 800 MG/1
800 TABLET ORAL EVERY 6 HOURS
Qty: 120 | Refills: 2 | Status: ACTIVE | COMMUNITY
Start: 2024-02-08 | End: 1900-01-01

## 2024-02-22 ENCOUNTER — APPOINTMENT (OUTPATIENT)
Dept: OBGYN | Facility: CLINIC | Age: 29
End: 2024-02-22
Payer: MEDICAID

## 2024-02-22 VITALS
WEIGHT: 160 LBS | BODY MASS INDEX: 27.31 KG/M2 | HEIGHT: 64 IN | SYSTOLIC BLOOD PRESSURE: 112 MMHG | DIASTOLIC BLOOD PRESSURE: 72 MMHG

## 2024-02-22 DIAGNOSIS — Z30.09 ENCOUNTER FOR OTHER GENERAL COUNSELING AND ADVICE ON CONTRACEPTION: ICD-10-CM

## 2024-02-22 DIAGNOSIS — N94.6 DYSMENORRHEA, UNSPECIFIED: ICD-10-CM

## 2024-02-22 DIAGNOSIS — Z63.0 PROBLEMS IN RELATIONSHIP WITH SPOUSE OR PARTNER: ICD-10-CM

## 2024-02-22 DIAGNOSIS — Z30.432 ENCOUNTER FOR REMOVAL OF INTRAUTERINE CONTRACEPTIVE DEVICE: ICD-10-CM

## 2024-02-22 PROCEDURE — 58301 REMOVE INTRAUTERINE DEVICE: CPT

## 2024-02-22 RX ORDER — COPPER 313.4 MG/1
INTRAUTERINE DEVICE INTRAUTERINE
Refills: 0 | Status: DISCONTINUED | COMMUNITY
Start: 2023-07-03 | End: 2024-02-22

## 2024-02-22 SDOH — SOCIAL STABILITY - SOCIAL INSECURITY: PROBLEMS IN RELATIONSHIP WITH SPOUSE OR PARTNER: Z63.0

## 2024-02-22 NOTE — PLAN
[FreeTextEntry1] : PLAN  Consent signed IUD paragard removed without difficulty  Educated on R/B/A of CHC OCPs Educated on ACHES acronym of CHC OCP warning signs Discussed to use condoms for 7 days while initialing the OCPs  RTO 6 months or PRN

## 2024-02-22 NOTE — PROCEDURE
[IUD Removal] : intrauterine device (IUD) removal [Time out performed] : Pre-procedure time out performed.  Patient's name, date of birth and procedure confirmed. [Consent Obtained] : Consent obtained [Menorrhagia] : menorrhagia [Dysmenorrhea] : dysmenorrhea [Risks] : risks [Benefits] : benefits [Alternatives] : alternatives [Patient] : patient [Speculum Placed] : speculum placed [Strings Visualized] : strings visualized [IUD Discarded] : IUD discarded [Sent to Pathology] : specimen was placed in buffered formalin and sent for pathology [Tolerated Well] : Patient tolerated the procedure well [No Complications] : no complications [Heavy Vaginal Bleeding] : for heavy vaginal bleeding [Pelvic Pain] : for pelvic pain [___ Month(s)] : in [unfilled] month(s)

## 2024-02-22 NOTE — ASSESSMENT
[FreeTextEntry1] : 28yo P2 presents for IUD removal. Reports she has been having stabbing pain x 2 days ever since her menses have initiated and passing clots Desires to use [facility] OCPs for now- no contraindications to [facility]C OCPs

## 2024-02-28 ENCOUNTER — APPOINTMENT (OUTPATIENT)
Dept: ANTEPARTUM | Facility: CLINIC | Age: 29
End: 2024-02-28

## 2024-02-28 ENCOUNTER — APPOINTMENT (OUTPATIENT)
Dept: OBGYN | Facility: CLINIC | Age: 29
End: 2024-02-28

## 2024-02-28 ENCOUNTER — NON-APPOINTMENT (OUTPATIENT)
Age: 29
End: 2024-02-28

## 2024-03-20 ENCOUNTER — APPOINTMENT (OUTPATIENT)
Dept: OBGYN | Facility: CLINIC | Age: 29
End: 2024-03-20
Payer: MEDICAID

## 2024-03-20 VITALS
DIASTOLIC BLOOD PRESSURE: 76 MMHG | HEIGHT: 64 IN | BODY MASS INDEX: 27.49 KG/M2 | WEIGHT: 161 LBS | SYSTOLIC BLOOD PRESSURE: 116 MMHG

## 2024-03-20 DIAGNOSIS — T83.32XA DISPLACEMENT OF INTRAUTERINE CONTRACEPTIVE DEVICE, INITIAL ENCOUNTER: ICD-10-CM

## 2024-03-20 DIAGNOSIS — Z30.09 ENCOUNTER FOR OTHER GENERAL COUNSELING AND ADVICE ON CONTRACEPTION: ICD-10-CM

## 2024-03-20 PROCEDURE — 99214 OFFICE O/P EST MOD 30 MIN: CPT

## 2024-03-20 NOTE — COUNSELING
[Confidentiality] : confidentiality [Contraception/ Emergency Contraception/ Safe Sexual Practices] : contraception, emergency contraception, safe sexual practices [Medication Management] : medication management

## 2024-03-20 NOTE — HISTORY OF PRESENT ILLNESS
[FreeTextEntry1] : 29-year-old female G2, P2 presenting office for consultation for permanent sterilization.  Patient reports recently having a copper IUD removed secondary to malpresentation/discomfort.  She has a history of 2 prior vaginal deliveries and does not desire future childbearing.  She is currently on a combined oral contraceptive.

## 2024-03-20 NOTE — PLAN
[FreeTextEntry1] : Risk-benefit alternatives of contraceptive options including LARCs versus SARCs versus permanent sterilization discussed at length and she is interested in bilateral salpingectomy.  Risk-benefit alternatives of the surgery including risk of injury, risk infection, risk of bleeding discussed at length.  Long-acting reproductive contraceptives such as Mirena and Kyleena were also reviewed at length and patient wishes to go home and discussed and will contact the office with her final decision.  Patient understands that if she undergoes a bilateral salpingectomy she will not be able to conceive on her own without the assistance of a reproductive specialist.  She was given opportunity ask questions all questions were addressed.  She understands the plan of care and will contact the office when she makes her decision.  The  line was used for entire conversation and consultation.  [id number]

## 2024-03-20 NOTE — PHYSICAL EXAM
[Appropriately responsive] : appropriately responsive [Alert] : alert [No Acute Distress] : no acute distress [Soft] : soft [Non-tender] : non-tender [Non-distended] : non-distended [No HSM] : No HSM [No Mass] : no mass [No Lesions] : no lesions [Oriented x3] : oriented x3

## 2024-04-03 ENCOUNTER — APPOINTMENT (OUTPATIENT)
Dept: OBGYN | Facility: CLINIC | Age: 29
End: 2024-04-03

## 2024-04-17 ENCOUNTER — APPOINTMENT (OUTPATIENT)
Dept: OBGYN | Facility: CLINIC | Age: 29
End: 2024-04-17
Payer: MEDICAID

## 2024-04-17 VITALS — SYSTOLIC BLOOD PRESSURE: 145 MMHG | HEART RATE: 90 BPM | DIASTOLIC BLOOD PRESSURE: 89 MMHG

## 2024-04-17 VITALS — HEART RATE: 101 BPM | DIASTOLIC BLOOD PRESSURE: 98 MMHG | SYSTOLIC BLOOD PRESSURE: 159 MMHG

## 2024-04-17 DIAGNOSIS — M79.604 PAIN IN RIGHT LEG: ICD-10-CM

## 2024-04-17 DIAGNOSIS — R53.83 OTHER FATIGUE: ICD-10-CM

## 2024-04-17 DIAGNOSIS — Z30.433 ENCOUNTER FOR REMOVAL AND REINSERTION OF INTRAUTERINE CONTRACEPTIVE DEVICE: ICD-10-CM

## 2024-04-17 DIAGNOSIS — Z32.02 ENCOUNTER FOR PREGNANCY TEST, RESULT NEGATIVE: ICD-10-CM

## 2024-04-17 DIAGNOSIS — Z30.430 ENCOUNTER FOR INSERTION OF INTRAUTERINE CONTRACEPTIVE DEVICE: ICD-10-CM

## 2024-04-17 LAB
HCG UR QL: NEGATIVE
QUALITY CONTROL: YES

## 2024-04-17 PROCEDURE — 81025 URINE PREGNANCY TEST: CPT

## 2024-04-17 PROCEDURE — 58300 INSERT INTRAUTERINE DEVICE: CPT

## 2024-04-17 PROCEDURE — 99213 OFFICE O/P EST LOW 20 MIN: CPT | Mod: 25

## 2024-04-17 RX ORDER — LEVONORGESTREL 52 MG/1
20 INTRAUTERINE DEVICE INTRAUTERINE
Qty: 1 | Refills: 0 | Status: ACTIVE | COMMUNITY
Start: 2024-04-17

## 2024-04-17 RX ORDER — DESOGESTREL AND ETHINYL ESTRADIOL 0.15-0.03
0.15-3 KIT ORAL
Qty: 3 | Refills: 1 | Status: DISCONTINUED | COMMUNITY
Start: 2024-02-22 | End: 2024-04-17

## 2024-04-17 NOTE — PLAN
[FreeTextEntry1] : PLAN  Pelvic sono ordered to assess for IUD position Reviewed R/B/A of IUD Discussed to use condoms for 7 days or  no intercourse  Reports feeling tired and worried its her thyriod because of her family hx.  CBC and TSH w/ reflex T4 referral given  Discussed she is due for annual PAP smear  RTO 2 weeks for annual PAP smear and results, 6 weeks for IUD check

## 2024-04-17 NOTE — PROCEDURE
[IUD Placement] : intrauterine device (IUD) placement [Time out performed] : Pre-procedure time out performed.  Patient's name, date of birth and procedure confirmed. [Consent Obtained] : Consent obtained [Prevention of Pregnancy] : prevention of pregnancy [Risks] : risks [Benefits] : benefits [Alternatives] : alternatives [Patient] : patient [Infection] : infection [Bleeding] : bleeding [Pain] : pain [Expulsion] : expulsion [Failure] : failure [Uterine Perforation] : uterine perforation [Neg Pregnancy Test] : negative pregnancy test [No Premedication] : No premedication [Betadine] : Betadine [Tenaculum] : Tenaculum [Easy Passage] : Easy passage [Mirena IUD] : Mirena IUD [Tolerated Well] : Patient tolerated the procedure well [No Complications] : No complications [None] : None [LMPDate] : 04/12/2024 [de-identified] : [id number] [de-identified] : 11/2026 [de-identified] : 11/2031

## 2024-04-23 LAB
BASOPHILS # BLD AUTO: 0.03 K/UL
BASOPHILS NFR BLD AUTO: 0.4 %
EOSINOPHIL # BLD AUTO: 0.04 K/UL
EOSINOPHIL NFR BLD AUTO: 0.6 %
HCT VFR BLD CALC: 42.5 %
HGB BLD-MCNC: 13.4 G/DL
IMM GRANULOCYTES NFR BLD AUTO: 0.8 %
LYMPHOCYTES # BLD AUTO: 1.8 K/UL
LYMPHOCYTES NFR BLD AUTO: 25.5 %
MAN DIFF?: NORMAL
MCHC RBC-ENTMCNC: 28.3 PG
MCHC RBC-ENTMCNC: 31.5 GM/DL
MCV RBC AUTO: 89.7 FL
MONOCYTES # BLD AUTO: 0.43 K/UL
MONOCYTES NFR BLD AUTO: 6.1 %
NEUTROPHILS # BLD AUTO: 4.71 K/UL
NEUTROPHILS NFR BLD AUTO: 66.6 %
PLATELET # BLD AUTO: 267 K/UL
RBC # BLD: 4.74 M/UL
RBC # FLD: 13 %
TSH SERPL-ACNC: 0.96 UIU/ML
WBC # FLD AUTO: 7.07 K/UL

## 2024-04-25 ENCOUNTER — APPOINTMENT (OUTPATIENT)
Dept: ULTRASOUND IMAGING | Facility: CLINIC | Age: 29
End: 2024-04-25
Payer: MEDICAID

## 2024-04-25 ENCOUNTER — OUTPATIENT (OUTPATIENT)
Dept: OUTPATIENT SERVICES | Facility: HOSPITAL | Age: 29
LOS: 1 days | End: 2024-04-25
Payer: COMMERCIAL

## 2024-04-25 DIAGNOSIS — M79.604 PAIN IN RIGHT LEG: ICD-10-CM

## 2024-04-25 PROCEDURE — 93970 EXTREMITY STUDY: CPT | Mod: 26

## 2024-04-25 PROCEDURE — 93970 EXTREMITY STUDY: CPT

## 2024-04-29 ENCOUNTER — APPOINTMENT (OUTPATIENT)
Dept: OBGYN | Facility: CLINIC | Age: 29
End: 2024-04-29
Payer: MEDICAID

## 2024-04-29 VITALS — WEIGHT: 165 LBS | SYSTOLIC BLOOD PRESSURE: 142 MMHG | BODY MASS INDEX: 28.32 KG/M2 | DIASTOLIC BLOOD PRESSURE: 90 MMHG

## 2024-04-29 DIAGNOSIS — N89.8 OTHER SPECIFIED NONINFLAMMATORY DISORDERS OF VAGINA: ICD-10-CM

## 2024-04-29 DIAGNOSIS — Z97.5 PRESENCE OF (INTRAUTERINE) CONTRACEPTIVE DEVICE: ICD-10-CM

## 2024-04-29 DIAGNOSIS — N94.10 UNSPECIFIED DYSPAREUNIA: ICD-10-CM

## 2024-04-29 PROCEDURE — 99213 OFFICE O/P EST LOW 20 MIN: CPT

## 2024-04-29 RX ORDER — METRONIDAZOLE 7.5 MG/G
0.75 GEL VAGINAL
Qty: 1 | Refills: 0 | Status: ACTIVE | COMMUNITY
Start: 2024-04-29 | End: 1900-01-01

## 2024-04-29 NOTE — REASON FOR VISIT
[Follow-Up] : a follow-up evaluation of [Dyspareunia] : dyspareunia [FreeTextEntry2] : pain with intercourse

## 2024-04-29 NOTE — PHYSICAL EXAM
[Appropriately responsive] : appropriately responsive [Alert] : alert [No Acute Distress] : no acute distress [Regular Rate Rhythm] : regular rate rhythm [Soft] : soft [Non-tender] : non-tender [Non-distended] : non-distended [No HSM] : No HSM [No Lesions] : no lesions [No Mass] : no mass [Oriented x3] : oriented x3 [Labia Majora] : normal [Labia Minora] : normal [Discharge] : a  ~M vaginal discharge was present [Moderate] : moderate [Foul Smelling] : foul smelling [White] : white [Frothy] : frothy [Normal] : normal [Uterine Adnexae] : normal

## 2024-04-29 NOTE — COUNSELING
[Nutrition/ Exercise/ Weight Management] : nutrition, exercise, weight management [Body Image] : body image [Vitamins/Supplements] : vitamins/supplements [Breast Self Exam] : breast self exam [Confidentiality] : confidentiality [STD (testing, results, tx)] : STD (testing, results, tx)

## 2024-04-29 NOTE — REVIEW OF SYSTEMS
[Genital Rash/Irritation] : genital rash/irritation [Negative] : Heme/Lymph [FreeTextEntry8] : pain with inter

## 2024-04-29 NOTE — PLAN
[FreeTextEntry1] : PLAN  Discussed to get pelvic sono done to assess for IUD strings  Affirm swab obtained- suspect BV vs trich will call with abnormal results and treatment  RTO 1 week for annual GYN exam

## 2024-04-29 NOTE — HISTORY OF PRESENT ILLNESS
[FreeTextEntry1] : 29yo  presents for acute visit with pain with intercourse, vaginal pain and spotting since IUD insertion on . Mirena IUD was placed on that date. Reports new partner in her life.  LMP 2024

## 2024-05-01 ENCOUNTER — OUTPATIENT (OUTPATIENT)
Dept: OUTPATIENT SERVICES | Facility: HOSPITAL | Age: 29
LOS: 1 days | End: 2024-05-01

## 2024-05-01 ENCOUNTER — APPOINTMENT (OUTPATIENT)
Dept: OBGYN | Facility: CLINIC | Age: 29
End: 2024-05-01

## 2024-05-01 ENCOUNTER — APPOINTMENT (OUTPATIENT)
Dept: ULTRASOUND IMAGING | Facility: CLINIC | Age: 29
End: 2024-05-01
Payer: MEDICAID

## 2024-05-01 DIAGNOSIS — Z00.8 ENCOUNTER FOR OTHER GENERAL EXAMINATION: ICD-10-CM

## 2024-05-01 LAB
CANDIDA VAG CYTO: NOT DETECTED
G VAGINALIS+PREV SP MTYP VAG QL MICRO: DETECTED
T VAGINALIS VAG QL WET PREP: NOT DETECTED

## 2024-05-01 PROCEDURE — 76856 US EXAM PELVIC COMPLETE: CPT | Mod: 26

## 2024-06-03 ENCOUNTER — ASOB RESULT (OUTPATIENT)
Age: 29
End: 2024-06-03

## 2024-06-03 ENCOUNTER — APPOINTMENT (OUTPATIENT)
Dept: ANTEPARTUM | Facility: CLINIC | Age: 29
End: 2024-06-03
Payer: MEDICAID

## 2024-06-03 PROCEDURE — 76856 US EXAM PELVIC COMPLETE: CPT | Mod: 59

## 2024-06-03 PROCEDURE — 76830 TRANSVAGINAL US NON-OB: CPT

## 2024-06-04 ENCOUNTER — APPOINTMENT (OUTPATIENT)
Dept: OBGYN | Facility: CLINIC | Age: 29
End: 2024-06-04
Payer: COMMERCIAL

## 2024-06-04 VITALS
WEIGHT: 171 LBS | SYSTOLIC BLOOD PRESSURE: 122 MMHG | BODY MASS INDEX: 29.19 KG/M2 | HEIGHT: 64 IN | DIASTOLIC BLOOD PRESSURE: 82 MMHG

## 2024-06-04 DIAGNOSIS — R39.9 UNSPECIFIED SYMPTOMS AND SIGNS INVOLVING THE GENITOURINARY SYSTEM: ICD-10-CM

## 2024-06-04 PROCEDURE — 99213 OFFICE O/P EST LOW 20 MIN: CPT

## 2024-06-04 RX ORDER — CIPROFLOXACIN HYDROCHLORIDE 500 MG/1
500 TABLET, FILM COATED ORAL
Qty: 6 | Refills: 0 | Status: ACTIVE | COMMUNITY
Start: 2024-06-04 | End: 1900-01-01

## 2024-06-04 NOTE — PHYSICAL EXAM
[Chaperone Declined] : Patient declined chaperone [Normal] : uterus [Discharge] : a  ~M vaginal discharge was present [Heavy] : heavy [Clear] : clear [Watery] : watery [No Bleeding] : there was no active vaginal bleeding [IUD String] : had an IUD string protruding out [Uterine Adnexae] : were not tender and not enlarged

## 2024-06-10 LAB
A VAGINAE DNA VAG QL NAA+PROBE: NORMAL
BVAB2 DNA VAG QL NAA+PROBE: NORMAL
C KRUSEI DNA VAG QL NAA+PROBE: NEGATIVE
CANDIDA DNA VAG QL NAA+PROBE: NEGATIVE
MEGA1 DNA VAG QL NAA+PROBE: ABNORMAL
T VAGINALIS RRNA SPEC QL NAA+PROBE: NEGATIVE

## 2024-10-05 ENCOUNTER — EMERGENCY (EMERGENCY)
Facility: HOSPITAL | Age: 29
LOS: 1 days | Discharge: HOME | End: 2024-10-05
Attending: EMERGENCY MEDICINE | Admitting: EMERGENCY MEDICINE
Payer: MEDICAID

## 2024-10-05 VITALS
RESPIRATION RATE: 18 BRPM | HEART RATE: 78 BPM | TEMPERATURE: 98 F | SYSTOLIC BLOOD PRESSURE: 111 MMHG | DIASTOLIC BLOOD PRESSURE: 62 MMHG | OXYGEN SATURATION: 98 %

## 2024-10-05 VITALS
TEMPERATURE: 98 F | RESPIRATION RATE: 16 BRPM | SYSTOLIC BLOOD PRESSURE: 117 MMHG | HEART RATE: 96 BPM | HEIGHT: 66 IN | WEIGHT: 169.98 LBS | OXYGEN SATURATION: 98 % | DIASTOLIC BLOOD PRESSURE: 87 MMHG

## 2024-10-05 LAB
ALBUMIN SERPL ELPH-MCNC: 3.6 G/DL — SIGNIFICANT CHANGE UP (ref 3.3–5)
ALP SERPL-CCNC: 75 U/L — SIGNIFICANT CHANGE UP (ref 40–120)
ALT FLD-CCNC: 25 U/L — SIGNIFICANT CHANGE UP (ref 12–78)
ANION GAP SERPL CALC-SCNC: 10 MMOL/L — SIGNIFICANT CHANGE UP (ref 5–17)
AST SERPL-CCNC: 17 U/L — SIGNIFICANT CHANGE UP (ref 15–37)
BASOPHILS # BLD AUTO: 0.03 K/UL — SIGNIFICANT CHANGE UP (ref 0–0.2)
BASOPHILS NFR BLD AUTO: 0.4 % — SIGNIFICANT CHANGE UP (ref 0–2)
BILIRUB SERPL-MCNC: 0.4 MG/DL — SIGNIFICANT CHANGE UP (ref 0.2–1.2)
BUN SERPL-MCNC: 20 MG/DL — SIGNIFICANT CHANGE UP (ref 7–23)
CALCIUM SERPL-MCNC: 8.6 MG/DL — SIGNIFICANT CHANGE UP (ref 8.5–10.1)
CHLORIDE SERPL-SCNC: 108 MMOL/L — SIGNIFICANT CHANGE UP (ref 96–108)
CO2 SERPL-SCNC: 22 MMOL/L — SIGNIFICANT CHANGE UP (ref 22–31)
CREAT SERPL-MCNC: 0.75 MG/DL — SIGNIFICANT CHANGE UP (ref 0.5–1.3)
D DIMER BLD IA.RAPID-MCNC: 162 NG/ML DDU — SIGNIFICANT CHANGE UP
EGFR: 112 ML/MIN/1.73M2 — SIGNIFICANT CHANGE UP
EOSINOPHIL # BLD AUTO: 0.12 K/UL — SIGNIFICANT CHANGE UP (ref 0–0.5)
EOSINOPHIL NFR BLD AUTO: 1.4 % — SIGNIFICANT CHANGE UP (ref 0–6)
GLUCOSE SERPL-MCNC: 88 MG/DL — SIGNIFICANT CHANGE UP (ref 70–99)
HCG SERPL-ACNC: <1 MIU/ML — SIGNIFICANT CHANGE UP
HCT VFR BLD CALC: 37.4 % — SIGNIFICANT CHANGE UP (ref 34.5–45)
HGB BLD-MCNC: 12.4 G/DL — SIGNIFICANT CHANGE UP (ref 11.5–15.5)
IMM GRANULOCYTES NFR BLD AUTO: 1.3 % — HIGH (ref 0–0.9)
LIDOCAIN IGE QN: 31 U/L — SIGNIFICANT CHANGE UP (ref 13–75)
LYMPHOCYTES # BLD AUTO: 2.6 K/UL — SIGNIFICANT CHANGE UP (ref 1–3.3)
LYMPHOCYTES # BLD AUTO: 30.9 % — SIGNIFICANT CHANGE UP (ref 13–44)
MAGNESIUM SERPL-MCNC: 2 MG/DL — SIGNIFICANT CHANGE UP (ref 1.6–2.6)
MCHC RBC-ENTMCNC: 29.5 PG — SIGNIFICANT CHANGE UP (ref 27–34)
MCHC RBC-ENTMCNC: 33.2 GM/DL — SIGNIFICANT CHANGE UP (ref 32–36)
MCV RBC AUTO: 88.8 FL — SIGNIFICANT CHANGE UP (ref 80–100)
MONOCYTES # BLD AUTO: 0.55 K/UL — SIGNIFICANT CHANGE UP (ref 0–0.9)
MONOCYTES NFR BLD AUTO: 6.5 % — SIGNIFICANT CHANGE UP (ref 2–14)
NEUTROPHILS # BLD AUTO: 5 K/UL — SIGNIFICANT CHANGE UP (ref 1.8–7.4)
NEUTROPHILS NFR BLD AUTO: 59.5 % — SIGNIFICANT CHANGE UP (ref 43–77)
NRBC # BLD: 0 /100 WBCS — SIGNIFICANT CHANGE UP (ref 0–0)
NT-PROBNP SERPL-SCNC: 28 PG/ML — SIGNIFICANT CHANGE UP (ref 0–125)
PLATELET # BLD AUTO: 193 K/UL — SIGNIFICANT CHANGE UP (ref 150–400)
POTASSIUM SERPL-MCNC: 3.5 MMOL/L — SIGNIFICANT CHANGE UP (ref 3.5–5.3)
POTASSIUM SERPL-SCNC: 3.5 MMOL/L — SIGNIFICANT CHANGE UP (ref 3.5–5.3)
PROT SERPL-MCNC: 6.7 G/DL — SIGNIFICANT CHANGE UP (ref 6–8.3)
RBC # BLD: 4.21 M/UL — SIGNIFICANT CHANGE UP (ref 3.8–5.2)
RBC # FLD: 12.5 % — SIGNIFICANT CHANGE UP (ref 10.3–14.5)
SODIUM SERPL-SCNC: 140 MMOL/L — SIGNIFICANT CHANGE UP (ref 135–145)
TROPONIN I, HIGH SENSITIVITY RESULT: <3 NG/L — SIGNIFICANT CHANGE UP
WBC # BLD: 8.41 K/UL — SIGNIFICANT CHANGE UP (ref 3.8–10.5)
WBC # FLD AUTO: 8.41 K/UL — SIGNIFICANT CHANGE UP (ref 3.8–10.5)

## 2024-10-05 PROCEDURE — 99285 EMERGENCY DEPT VISIT HI MDM: CPT

## 2024-10-05 PROCEDURE — 93010 ELECTROCARDIOGRAM REPORT: CPT

## 2024-10-05 PROCEDURE — 83880 ASSAY OF NATRIURETIC PEPTIDE: CPT

## 2024-10-05 PROCEDURE — 85025 COMPLETE CBC W/AUTO DIFF WBC: CPT

## 2024-10-05 PROCEDURE — 71045 X-RAY EXAM CHEST 1 VIEW: CPT | Mod: 26

## 2024-10-05 PROCEDURE — 84484 ASSAY OF TROPONIN QUANT: CPT

## 2024-10-05 PROCEDURE — 85379 FIBRIN DEGRADATION QUANT: CPT

## 2024-10-05 PROCEDURE — 83735 ASSAY OF MAGNESIUM: CPT

## 2024-10-05 PROCEDURE — 84702 CHORIONIC GONADOTROPIN TEST: CPT

## 2024-10-05 PROCEDURE — 36415 COLL VENOUS BLD VENIPUNCTURE: CPT

## 2024-10-05 PROCEDURE — 83690 ASSAY OF LIPASE: CPT

## 2024-10-05 PROCEDURE — T1013: CPT

## 2024-10-05 PROCEDURE — 71045 X-RAY EXAM CHEST 1 VIEW: CPT

## 2024-10-05 PROCEDURE — 80053 COMPREHEN METABOLIC PANEL: CPT

## 2024-10-05 PROCEDURE — 93005 ELECTROCARDIOGRAM TRACING: CPT

## 2024-10-05 NOTE — ED ADULT TRIAGE NOTE - CHIEF COMPLAINT QUOTE
29y F  from home to ED triage.. pt c/o intermittent substernal CP, HA, and periods of SOB.. x2 weeks, daily episodes.. Malay speaking; [employer]  [id number] Lexus beebe

## 2024-10-05 NOTE — ED PROVIDER NOTE - PATIENT PORTAL LINK FT
You can access the FollowMyHealth Patient Portal offered by [facility] by registering at the following website: [email or web address]. By joining [employer]’s FollowMyHealth portal, you will also be able to view your health information using other applications (apps) compatible with our system.

## 2024-10-05 NOTE — ED ADULT NURSE REASSESSMENT NOTE - NS ED NURSE REASSESS COMMENT FT1
Alert and oriented x4, calm and cooperative. Denies pain, denies chest pain or SOB now. IV removed per dc. VSS. Educated on dc and stated an understanding. left ER without issues.

## 2024-10-05 NOTE — ED PROVIDER NOTE - PSYCHIATRIC, MLM
Alert and oriented to person, place, time/situation. normal mood and affect. no apparent risk to self or others. cooperative interactive

## 2024-10-05 NOTE — ED PROVIDER NOTE - CPE EDP MUSC NORM
Called pt, left message on voicemail.     [facility] website has pt with last name Charli.  Epic shows last name as Keke Prieto.    Unable to enter referral in [facility] website under Keke Prieto as it does not recognize her.    Called [facility]- pt is to call member services at [phone] to correct her name.     Pt's insurance is actually an HMO, unable to order at [facility].   normal...

## 2024-10-05 NOTE — ED PROVIDER NOTE - OBJECTIVE STATEMENT
With  Cristiane [id number]    Patient is a 29-year-old female who presents emergency room with 2 months of on and off symptoms of pain in her chest, occasional symptoms of shortness of breath and near syncope like sensation.  For the past 2 weeks this has been happening daily.  And now accompanied by occasional headaches.  There is no significant provocation that she can recall.  She denies pregnancy, denies domestic violence, denies travel or ill contacts.  She works at a [employer].  She has no significant medical surgical history.  She has no primary care physician.  She has no family history of cardiac disease or sudden death.  She is a daily smoker not a drinker or drug user.  No drug allergies.  On further questioning she endorses the fact that she is currently undergoing separation from her children's father.  Who wants nothing to do with her children.  So he is leaving her with the kids to care for them and this is causing enormous amount of stress.  She endorses that when she wakes up after a stressful night she has increased fatigue in the morning feeling more exhausted than before she went to bed.  She denies cough fever chills.  She denies dyspnea on exertion.  She denies calf pain or calf tenderness.  She denies abdominal pain.  She denies nausea vomiting diarrhea.  She denies flu COVID symptoms.  She refuses counseling or referral at this time.

## 2024-10-05 NOTE — ED PROVIDER NOTE - CARE PROVIDER_API CALL
Gabe Garcia  Cardiology  [address] [place] [address]  Phone: [phone]  Fax: [phone]  Follow Up Time:

## 2024-10-05 NOTE — ED PROVIDER NOTE - NSFOLLOWUPINSTRUCTIONS_ED_ALL_ED_FT
follow up with clinic and cardiology  stop smoking  if your symptoms worsen or you have any concerns call 911 and go to nearest appropriate emergency room

## 2024-10-05 NOTE — ED ADULT NURSE NOTE - OBJECTIVE STATEMENT
Pt presents to the ED c/o chest pain x2 months. Pt presents to the ED c/o chest pain x2 months but worsening over last 2 weeks. Reports sob, and headache as well. No pertinent medical hx. IV established in left arm with a 20G, labs drawn and sent, call bell in reach, warm blanket provided, bed in lowest position, side rails up x2, MD evaluation in progress, pt on telemetry. Pt denies pain at this time. SpO2 100% on RA.

## 2024-10-05 NOTE — ED PROVIDER NOTE - CLINICAL SUMMARY MEDICAL DECISION MAKING FREE TEXT BOX
Patient is a 29-year-old female who has 2 months of on and off chest discomfort dyspnea on exertion now almost steadily over the past 2 weeks.  Undergoing divorce proceedings with her  who is the father of her children.  He wants to leave her.  She has care for her children with the .  She works daily at a [employer].  She endorses increased fatigue and stress.  Denies domestic violence.  She denies drug use.  She denies dyspnea on exertion.  She denies any urinary symptoms.  She is a denies lack of housing or other social issues.  She has been in the [facility] for over 7 years.  Has no other family to rely on.  Physical examination is otherwise unremarkable.  Patient has good insight.  She is normal affect.  EKG is normal.  Plan of care includes rule out anemia rule out ACS, rule out PE rule out pneumonia.  Rule out pregnancy.  Will obtain chest x-ray, EKG, placed patient on cardiac monitor, obtain laboratory studies.  Upon discharge patient will be given referrals to counseling centers for coping.  This seems very unlikely to be cardiac or any of the other after mentioned.  More stress related.  This chart was made with dictation software and may contain typographical errors.

## 2024-10-05 NOTE — ED ADULT NURSE NOTE - CHIEF COMPLAINT QUOTE
29y F  from home to ED triage.. pt c/o intermittent substernal CP, HA, and periods of SOB.. x2 weeks, daily episodes.. Setswana speaking; [employer]  [id number] Lexus beebe

## 2024-10-05 NOTE — ED PROVIDER NOTE - NSFOLLOWUPCLINICS_GEN_ALL_ED_FT
[facility]  Psychiatry  [address] [place] [address]  Phone: [phone]  Fax:     [facility] - Primary Care  Primary Care  [address] [place] [address]  Phone: [phone]  Fax:     [facility]  [address] [place] [address]  Phone: [phone]  Fax:

## 2024-10-22 ENCOUNTER — APPOINTMENT (OUTPATIENT)
Dept: OBGYN | Facility: CLINIC | Age: 29
End: 2024-10-22
Payer: COMMERCIAL

## 2024-10-22 VITALS
HEIGHT: 64 IN | SYSTOLIC BLOOD PRESSURE: 108 MMHG | WEIGHT: 191 LBS | BODY MASS INDEX: 32.61 KG/M2 | DIASTOLIC BLOOD PRESSURE: 70 MMHG

## 2024-10-22 DIAGNOSIS — R39.9 UNSPECIFIED SYMPTOMS AND SIGNS INVOLVING THE GENITOURINARY SYSTEM: ICD-10-CM

## 2024-10-22 PROCEDURE — 99213 OFFICE O/P EST LOW 20 MIN: CPT

## 2024-10-22 RX ORDER — CIPROFLOXACIN HYDROCHLORIDE 500 MG/1
500 TABLET, FILM COATED ORAL
Qty: 6 | Refills: 0 | Status: ACTIVE | COMMUNITY
Start: 2024-10-22 | End: 1900-01-01

## 2024-10-26 LAB — BACTERIA UR CULT: ABNORMAL

## 2024-11-22 ENCOUNTER — APPOINTMENT (OUTPATIENT)
Dept: OBGYN | Facility: CLINIC | Age: 29
End: 2024-11-22

## 2024-11-24 ENCOUNTER — NON-APPOINTMENT (OUTPATIENT)
Age: 29
End: 2024-11-24

## 2025-02-13 ENCOUNTER — EMERGENCY (EMERGENCY)
Facility: HOSPITAL | Age: 30
LOS: 1 days | Discharge: HOME | End: 2025-02-13
Attending: STUDENT IN AN ORGANIZED HEALTH CARE EDUCATION/TRAINING PROGRAM | Admitting: STUDENT IN AN ORGANIZED HEALTH CARE EDUCATION/TRAINING PROGRAM
Payer: MEDICAID

## 2025-02-13 VITALS
HEIGHT: 63 IN | DIASTOLIC BLOOD PRESSURE: 66 MMHG | WEIGHT: 139.99 LBS | OXYGEN SATURATION: 97 % | RESPIRATION RATE: 18 BRPM | HEART RATE: 73 BPM | TEMPERATURE: 98 F | SYSTOLIC BLOOD PRESSURE: 90 MMHG

## 2025-02-13 VITALS
TEMPERATURE: 98 F | DIASTOLIC BLOOD PRESSURE: 64 MMHG | OXYGEN SATURATION: 100 % | HEART RATE: 84 BPM | RESPIRATION RATE: 18 BRPM | SYSTOLIC BLOOD PRESSURE: 105 MMHG

## 2025-02-13 LAB
ALBUMIN SERPL ELPH-MCNC: 3.5 G/DL — SIGNIFICANT CHANGE UP (ref 3.3–5)
ALP SERPL-CCNC: 69 U/L — SIGNIFICANT CHANGE UP (ref 40–120)
ALT FLD-CCNC: 31 U/L — SIGNIFICANT CHANGE UP (ref 12–78)
ANION GAP SERPL CALC-SCNC: 9 MMOL/L — SIGNIFICANT CHANGE UP (ref 5–17)
AST SERPL-CCNC: 14 U/L — LOW (ref 15–37)
BASOPHILS # BLD AUTO: 0.04 K/UL — SIGNIFICANT CHANGE UP (ref 0–0.2)
BASOPHILS NFR BLD AUTO: 0.5 % — SIGNIFICANT CHANGE UP (ref 0–2)
BILIRUB SERPL-MCNC: 0.4 MG/DL — SIGNIFICANT CHANGE UP (ref 0.2–1.2)
BUN SERPL-MCNC: 16 MG/DL — SIGNIFICANT CHANGE UP (ref 7–23)
CALCIUM SERPL-MCNC: 8.9 MG/DL — SIGNIFICANT CHANGE UP (ref 8.5–10.1)
CHLORIDE SERPL-SCNC: 108 MMOL/L — SIGNIFICANT CHANGE UP (ref 96–108)
CO2 SERPL-SCNC: 24 MMOL/L — SIGNIFICANT CHANGE UP (ref 22–31)
CREAT SERPL-MCNC: 0.65 MG/DL — SIGNIFICANT CHANGE UP (ref 0.5–1.3)
D DIMER BLD IA.RAPID-MCNC: <150 NG/ML DDU — SIGNIFICANT CHANGE UP
EGFR: 123 ML/MIN/1.73M2 — SIGNIFICANT CHANGE UP
EOSINOPHIL # BLD AUTO: 0.11 K/UL — SIGNIFICANT CHANGE UP (ref 0–0.5)
EOSINOPHIL NFR BLD AUTO: 1.5 % — SIGNIFICANT CHANGE UP (ref 0–6)
FLUAV AG NPH QL: SIGNIFICANT CHANGE UP
FLUBV AG NPH QL: SIGNIFICANT CHANGE UP
GLUCOSE SERPL-MCNC: 99 MG/DL — SIGNIFICANT CHANGE UP (ref 70–99)
HCG SERPL-ACNC: <1 MIU/ML — SIGNIFICANT CHANGE UP
HCT VFR BLD CALC: 39.3 % — SIGNIFICANT CHANGE UP (ref 34.5–45)
HGB BLD-MCNC: 13.4 G/DL — SIGNIFICANT CHANGE UP (ref 11.5–15.5)
IMM GRANULOCYTES NFR BLD AUTO: 1.6 % — HIGH (ref 0–0.9)
LIDOCAIN IGE QN: 21 U/L — SIGNIFICANT CHANGE UP (ref 13–75)
LYMPHOCYTES # BLD AUTO: 2.66 K/UL — SIGNIFICANT CHANGE UP (ref 1–3.3)
LYMPHOCYTES # BLD AUTO: 35.8 % — SIGNIFICANT CHANGE UP (ref 13–44)
MAGNESIUM SERPL-MCNC: 2.1 MG/DL — SIGNIFICANT CHANGE UP (ref 1.6–2.6)
MCHC RBC-ENTMCNC: 30.3 PG — SIGNIFICANT CHANGE UP (ref 27–34)
MCHC RBC-ENTMCNC: 34.1 G/DL — SIGNIFICANT CHANGE UP (ref 32–36)
MCV RBC AUTO: 88.9 FL — SIGNIFICANT CHANGE UP (ref 80–100)
MONOCYTES # BLD AUTO: 0.57 K/UL — SIGNIFICANT CHANGE UP (ref 0–0.9)
MONOCYTES NFR BLD AUTO: 7.7 % — SIGNIFICANT CHANGE UP (ref 2–14)
NEUTROPHILS # BLD AUTO: 3.93 K/UL — SIGNIFICANT CHANGE UP (ref 1.8–7.4)
NEUTROPHILS NFR BLD AUTO: 52.9 % — SIGNIFICANT CHANGE UP (ref 43–77)
NRBC BLD AUTO-RTO: 0 /100 WBCS — SIGNIFICANT CHANGE UP (ref 0–0)
PLATELET # BLD AUTO: 204 K/UL — SIGNIFICANT CHANGE UP (ref 150–400)
POTASSIUM SERPL-MCNC: 3.5 MMOL/L — SIGNIFICANT CHANGE UP (ref 3.5–5.3)
POTASSIUM SERPL-SCNC: 3.5 MMOL/L — SIGNIFICANT CHANGE UP (ref 3.5–5.3)
PROT SERPL-MCNC: 6.8 G/DL — SIGNIFICANT CHANGE UP (ref 6–8.3)
RBC # BLD: 4.42 M/UL — SIGNIFICANT CHANGE UP (ref 3.8–5.2)
RBC # FLD: 12.2 % — SIGNIFICANT CHANGE UP (ref 10.3–14.5)
RSV RNA NPH QL NAA+NON-PROBE: SIGNIFICANT CHANGE UP
SARS-COV-2 RNA SPEC QL NAA+PROBE: SIGNIFICANT CHANGE UP
SODIUM SERPL-SCNC: 141 MMOL/L — SIGNIFICANT CHANGE UP (ref 135–145)
TROPONIN I, HIGH SENSITIVITY RESULT: <3 NG/L — SIGNIFICANT CHANGE UP
WBC # BLD: 7.43 K/UL — SIGNIFICANT CHANGE UP (ref 3.8–10.5)
WBC # FLD AUTO: 7.43 K/UL — SIGNIFICANT CHANGE UP (ref 3.8–10.5)

## 2025-02-13 PROCEDURE — 96374 THER/PROPH/DIAG INJ IV PUSH: CPT

## 2025-02-13 PROCEDURE — 93005 ELECTROCARDIOGRAM TRACING: CPT

## 2025-02-13 PROCEDURE — 80053 COMPREHEN METABOLIC PANEL: CPT

## 2025-02-13 PROCEDURE — 87637 SARSCOV2&INF A&B&RSV AMP PRB: CPT

## 2025-02-13 PROCEDURE — 99285 EMERGENCY DEPT VISIT HI MDM: CPT

## 2025-02-13 PROCEDURE — T1013: CPT

## 2025-02-13 PROCEDURE — 71045 X-RAY EXAM CHEST 1 VIEW: CPT | Mod: 26

## 2025-02-13 PROCEDURE — 99285 EMERGENCY DEPT VISIT HI MDM: CPT | Mod: 25

## 2025-02-13 PROCEDURE — 84484 ASSAY OF TROPONIN QUANT: CPT

## 2025-02-13 PROCEDURE — 83735 ASSAY OF MAGNESIUM: CPT

## 2025-02-13 PROCEDURE — 36415 COLL VENOUS BLD VENIPUNCTURE: CPT

## 2025-02-13 PROCEDURE — 84702 CHORIONIC GONADOTROPIN TEST: CPT

## 2025-02-13 PROCEDURE — 83690 ASSAY OF LIPASE: CPT

## 2025-02-13 PROCEDURE — 85379 FIBRIN DEGRADATION QUANT: CPT

## 2025-02-13 PROCEDURE — 85025 COMPLETE CBC W/AUTO DIFF WBC: CPT

## 2025-02-13 PROCEDURE — 93010 ELECTROCARDIOGRAM REPORT: CPT

## 2025-02-13 PROCEDURE — 71045 X-RAY EXAM CHEST 1 VIEW: CPT

## 2025-02-13 RX ORDER — BACTERIOSTATIC SODIUM CHLORIDE 0.9 %
1000 VIAL (ML) INJECTION ONCE
Refills: 0 | Status: COMPLETED | OUTPATIENT
Start: 2025-02-13 | End: 2025-02-13

## 2025-02-13 RX ORDER — KETOROLAC TROMETHAMINE 10 MG
30 TABLET ORAL ONCE
Refills: 0 | Status: DISCONTINUED | OUTPATIENT
Start: 2025-02-13 | End: 2025-02-13

## 2025-02-13 RX ADMIN — Medication 30 MILLIGRAM(S): at 23:31

## 2025-02-13 RX ADMIN — Medication 1000 MILLILITER(S): at 23:32

## 2025-02-13 NOTE — ED PROVIDER NOTE - NSPTACCESSSVCSAPPT_ED_ALL_ED
90 days sent to pharmacy.  Please advise pt (and schedule if willing) that he is due for OV for further refills to recheck BP and DM recheck.  Also please remind him to complete FIT test from 5/2020.      Jasmyn Giles MBA, MS, PA-C     Specialty Care (immediate)...

## 2025-02-13 NOTE — ED PROVIDER NOTE - CARE PROVIDER_API CALL
Ginger Brice  Cardiology  [address] [place] [address]  Phone: [phone]  Fax: [phone]  Follow Up Time: 4-6 Days

## 2025-02-13 NOTE — ED ADULT TRIAGE NOTE - CHIEF COMPLAINT QUOTE
flu like symptoms, c/o cough.congestion, SOB when laying down flat and GALVAN, chills, body aches. pt states when sneezing she noted some blood in her nostrils. no active bleeding noted right now. Pt denies taking any medication at this time. denies n/v/d/ABD pain. Denies burning when voiding.

## 2025-02-13 NOTE — ED PROVIDER NOTE - OBJECTIVE STATEMENT
Patient is a 30-year-old female with no significant past medical history presents with chest pain today.  Patient reports she had acute onset of chest discomfort heaviness shortness of breath.  Patient notes overnight she developed cough but denies cough at this time.  Patient reports associated headache.  Patient reports she has episodes of lightheadedness and has passed out in the past but did not today.  Patient never followed up with a cardiologist.  Patient denies fever nausea vomiting diarrhea abdominal pain dysuria pregnancy

## 2025-02-13 NOTE — ED ADULT NURSE NOTE - OBJECTIVE STATEMENT
Patient complaining of headache, chest discomfort, and congestion.  states patient passed out yesterday. denies hs.

## 2025-02-13 NOTE — ED PROVIDER NOTE - PATIENT PORTAL LINK FT
You can access the FollowMyHealth Patient Portal offered by [facility] by registering at the following website: [email or web address]. By joining [employer]’s FollowMyHealth portal, you will also be able to view your health information using other applications (apps) compatible with our system.

## 2025-02-13 NOTE — ED PROVIDER NOTE - CLINICAL SUMMARY MEDICAL DECISION MAKING FREE TEXT BOX
Patient is a 30y old  Female who presents with a chief complaint of chest pain and lightheadedness, her symptoms started this morning. no h/o cardiac disease has never seen cardiologist.    PE: Patient is well appearing, no acute distress, no signs of head trauma, lungs clear bilaterally, abdomen is soft and nontender to palpation without guarding or rebound, normal pulses in all extremities    plan for labs, cardiac monitoring, imaging, reassess    Patient was re-evaluated at this time and they are feeling much better. no active chest pain at this time, her troponin is negative and dimer negative. she is low risk chest pain, no need for serial troponin since her symptoms started this morning. will follow up outpatient. The Patient will be discharged home at this time. Their lab and/or imaging results were explained with the patient and they were instructed to go over them with their PCP. All questions were answered at this time.     Patient was told to follow up with their PCP within the next 2 days. they were told to return to the ED if they have worsening pain, worsening breathing    patient will be discharged home at this time, they are in agreement with the plan

## 2025-06-27 NOTE — ED PROVIDER NOTE - EKG #1 DATE/TIME
Tried to call patient regarding EMB results of complex hyperplasia, she will need a GYN ONC consult. Will try to call her again    05-Oct-2024 17:52